# Patient Record
Sex: FEMALE | Race: WHITE | NOT HISPANIC OR LATINO | URBAN - METROPOLITAN AREA
[De-identification: names, ages, dates, MRNs, and addresses within clinical notes are randomized per-mention and may not be internally consistent; named-entity substitution may affect disease eponyms.]

---

## 2024-07-06 ENCOUNTER — HOSPITAL ENCOUNTER (EMERGENCY)
Facility: HOSPITAL | Age: 34
Discharge: HOME | End: 2024-07-06
Attending: INTERNAL MEDICINE
Payer: COMMERCIAL

## 2024-07-06 VITALS
DIASTOLIC BLOOD PRESSURE: 86 MMHG | HEIGHT: 62 IN | RESPIRATION RATE: 18 BRPM | HEART RATE: 84 BPM | SYSTOLIC BLOOD PRESSURE: 130 MMHG | TEMPERATURE: 99 F | BODY MASS INDEX: 30 KG/M2 | WEIGHT: 163 LBS | OXYGEN SATURATION: 98 %

## 2024-07-06 DIAGNOSIS — M25.522 ELBOW PAIN, LEFT: ICD-10-CM

## 2024-07-06 DIAGNOSIS — S52.125A CLOSED NONDISPLACED FRACTURE OF HEAD OF LEFT RADIUS, INITIAL ENCOUNTER: Primary | ICD-10-CM

## 2024-07-06 LAB
B-HCG UR QL: NEGATIVE
CTP QC/QA: YES

## 2024-07-06 PROCEDURE — 63600175 PHARM REV CODE 636 W HCPCS: Mod: ER | Performed by: INTERNAL MEDICINE

## 2024-07-06 PROCEDURE — 81025 URINE PREGNANCY TEST: CPT | Mod: ER | Performed by: INTERNAL MEDICINE

## 2024-07-06 PROCEDURE — 96372 THER/PROPH/DIAG INJ SC/IM: CPT | Performed by: INTERNAL MEDICINE

## 2024-07-06 PROCEDURE — 99284 EMERGENCY DEPT VISIT MOD MDM: CPT | Mod: 25,ER

## 2024-07-06 PROCEDURE — 81025 URINE PREGNANCY TEST: CPT | Mod: ER

## 2024-07-06 RX ORDER — KETOROLAC TROMETHAMINE 30 MG/ML
60 INJECTION, SOLUTION INTRAMUSCULAR; INTRAVENOUS
Status: COMPLETED | OUTPATIENT
Start: 2024-07-06 | End: 2024-07-06

## 2024-07-06 RX ORDER — IBUPROFEN 800 MG/1
800 TABLET ORAL EVERY 8 HOURS PRN
Qty: 30 TABLET | Refills: 0 | Status: SHIPPED | OUTPATIENT
Start: 2024-07-06

## 2024-07-06 RX ADMIN — KETOROLAC TROMETHAMINE 60 MG: 30 INJECTION, SOLUTION INTRAMUSCULAR at 10:07

## 2024-07-07 NOTE — ED PROVIDER NOTES
Encounter Date: 7/6/2024       History     Chief Complaint   Patient presents with    Arm Injury     Left arm injury after tripping and falling onto her elbow. Edema noted at elbow. Pain radiating down left arm.      33-year-old female presents to the emergency department complaining of left elbow pain after slip and fall prior to ER presentation.  She denies any other injuries.    The history is provided by the patient. No  was used.     Review of patient's allergies indicates:   Allergen Reactions    Penicillins Rash     No past medical history on file.  No past surgical history on file.  No family history on file.     Review of Systems   Constitutional:  Negative for chills and fever.   Cardiovascular:  Negative for chest pain.   Musculoskeletal:  Positive for arthralgias. Negative for back pain, gait problem, myalgias and neck pain.   All other systems reviewed and are negative.      Physical Exam     Initial Vitals [07/06/24 2143]   BP Pulse Resp Temp SpO2   (!) 141/100 80 18 99 °F (37.2 °C) 98 %      MAP       --         Physical Exam    Nursing note and vitals reviewed.  Constitutional: She is not diaphoretic. No distress.   Neck:   Normal range of motion.  Cardiovascular:  Normal rate and regular rhythm.           Pulmonary/Chest: Breath sounds normal. No respiratory distress.   Abdominal: Abdomen is soft. Bowel sounds are normal.   Musculoskeletal:      Cervical back: Normal range of motion.      Comments: Left elbow pain upon movement without gross deformity.  There is tenderness to palpation and bilateral upper extremities are neurovascularly intact.     Neurological: She is alert. She has normal strength.   Skin: Skin is warm and dry. Capillary refill takes less than 2 seconds.   Psychiatric: She has a normal mood and affect.         ED Course   Procedures  Labs Reviewed   POCT URINE PREGNANCY          Imaging Results              X-Ray Elbow Complete Left (Final result)  Result time  07/06/24 22:22:24      Final result by Nakul Fletcher DO (07/06/24 22:22:24)                   Impression:      Nondisplaced radial head fracture.      Electronically signed by: Nakul lFetcher  Date:    07/06/2024  Time:    22:22               Narrative:    EXAMINATION:  XR ELBOW COMPLETE 3 VIEW LEFT    CLINICAL HISTORY:  Pain in left elbow    TECHNIQUE:  AP, lateral, and oblique views of the left elbow were performed.    COMPARISON:  None    FINDINGS:  There is a subtle nondisplaced fracture of the left radial head.  There is an elbow joint effusion.  No additional fractures are seen.  Alignment is normal.                                       Medications   ketorolac injection 60 mg (60 mg Intramuscular Given 7/6/24 1627)     Medical Decision Making  33-year-old female presents to the emergency department complaining of left elbow pain after slip and fall prior to ER presentation.  She denies any other injuries.  Course of ED stay:   X-ray of left elbow reveals nondisplaced radial head fracture.  Sling was placed and patient received a referral to Orthopedic surgery Clinic for further evaluation/treatment.  Toradol was given in the ED as well as a prescription for ibuprofen.  Patient was advised to follow-up with orthopedic surgery clinic as scheduled and with her PCP within the next week for re-evaluation/return to the emergency department if condition worsens.    Amount and/or Complexity of Data Reviewed  Labs: ordered.  Radiology: ordered.                                      Clinical Impression:  Final diagnoses:  [M25.522] Elbow pain, left  [S52.125A] Closed nondisplaced fracture of head of left radius, initial encounter (Primary)          ED Disposition Condition    Discharge Stable          ED Prescriptions       Medication Sig Dispense Start Date End Date Auth. Provider    ibuprofen (ADVIL,MOTRIN) 800 MG tablet Take 1 tablet (800 mg total) by mouth every 8 (eight) hours as needed. 30 tablet 7/6/2024 --  Germain Fischer MD          Follow-up Information    None          Germain Fischer MD  07/06/24 2982

## 2024-07-15 ENCOUNTER — OFFICE VISIT (OUTPATIENT)
Dept: ORTHOPEDICS | Facility: CLINIC | Age: 34
End: 2024-07-15
Payer: COMMERCIAL

## 2024-07-15 DIAGNOSIS — S52.125A CLOSED NONDISPLACED FRACTURE OF HEAD OF LEFT RADIUS, INITIAL ENCOUNTER: ICD-10-CM

## 2024-07-15 PROCEDURE — 1159F MED LIST DOCD IN RCRD: CPT | Mod: CPTII,S$GLB,, | Performed by: PHYSICIAN ASSISTANT

## 2024-07-15 PROCEDURE — 99999 PR PBB SHADOW E&M-EST. PATIENT-LVL II: CPT | Mod: PBBFAC,,, | Performed by: PHYSICIAN ASSISTANT

## 2024-07-15 PROCEDURE — 99203 OFFICE O/P NEW LOW 30 MIN: CPT | Mod: S$GLB,,, | Performed by: PHYSICIAN ASSISTANT

## 2024-07-15 NOTE — PROGRESS NOTES
Chief Complaint & History of Present Illness:  Yoselin Dunn is a 33 y.o. year old female presenting for evaluation of left elbow injury.  She tripped and fell in her kitchen on 7/6. The pain is described as achy. It is aggravated by  supination/pronation .  She has taken occasional OTC medications for this.  Her range of motion is improving. Previous treatment includes rest, tylenol, sling. There is not a history of previous injury or surgery to the elbow.      Review of patient's allergies indicates:   Allergen Reactions    Penicillins Rash         Current Outpatient Medications   Medication Sig Dispense Refill    ibuprofen (ADVIL,MOTRIN) 800 MG tablet Take 1 tablet (800 mg total) by mouth every 8 (eight) hours as needed. 30 tablet 0     No current facility-administered medications for this visit.       No past medical history on file.    No past surgical history on file.      Review of Systems:  ROS:  Constitutional: no fever or chills  Eyes: no visual changes  ENT: no nasal congestion or sore throat  Respiratory: no cough or shortness of breath  Cardiovascular: no chest pain or palpitations  Musculoskeletal: no arthralgias or myalgias      OBJECTIVE:     PHYSICAL EXAM:   General Appearance: Well nourished, well developed, in no acute distress.  CV: 2+ UE/LE distal pulses bilaterally.  Resp:  Respirations equal and unlabored.  Neurological: Mood & affect are normal.  GI: Abdomen soft and non-tender.  left  Elbow:   Skin intact  No warmth  ROM   ltsi C5-T1  + epl, io, fds, fdp   2+ RP     RADIOGRAPHS:  X-rays of the left elbow, personally reviewed by me, demonstrate nondisplaced radial head fracture.    ASSESSMENT/PLAN:     33 year old female with left elbow radial head fracture    9 days s/p injury  - Recommend gentle ROM exercises  - No heavy lifting, pushing or pulling  - D/c sling  - follow up 3 weeks with x-rays left elbow

## 2024-07-30 DIAGNOSIS — S52.125A CLOSED NONDISPLACED FRACTURE OF HEAD OF LEFT RADIUS, INITIAL ENCOUNTER: Primary | ICD-10-CM

## 2024-08-05 ENCOUNTER — HOSPITAL ENCOUNTER (OUTPATIENT)
Dept: RADIOLOGY | Facility: HOSPITAL | Age: 34
Discharge: HOME | End: 2024-08-05
Attending: PHYSICIAN ASSISTANT
Payer: COMMERCIAL

## 2024-08-05 ENCOUNTER — OFFICE VISIT (OUTPATIENT)
Dept: ORTHOPEDICS | Facility: CLINIC | Age: 34
End: 2024-08-05
Payer: COMMERCIAL

## 2024-08-05 DIAGNOSIS — S52.125D CLOSED NONDISPLACED FRACTURE OF HEAD OF LEFT RADIUS WITH ROUTINE HEALING, SUBSEQUENT ENCOUNTER: Primary | ICD-10-CM

## 2024-08-05 DIAGNOSIS — S52.125A CLOSED NONDISPLACED FRACTURE OF HEAD OF LEFT RADIUS, INITIAL ENCOUNTER: ICD-10-CM

## 2024-08-05 PROCEDURE — 99999 PR PBB SHADOW E&M-EST. PATIENT-LVL II: CPT | Mod: PBBFAC,,, | Performed by: PHYSICIAN ASSISTANT

## 2024-08-05 PROCEDURE — 1159F MED LIST DOCD IN RCRD: CPT | Mod: CPTII,S$GLB,, | Performed by: PHYSICIAN ASSISTANT

## 2024-08-05 PROCEDURE — 73080 X-RAY EXAM OF ELBOW: CPT | Mod: 26,LT,, | Performed by: RADIOLOGY

## 2024-08-05 PROCEDURE — 73080 X-RAY EXAM OF ELBOW: CPT | Mod: TC,LT

## 2024-08-05 PROCEDURE — 99212 OFFICE O/P EST SF 10 MIN: CPT | Mod: S$GLB,,, | Performed by: PHYSICIAN ASSISTANT

## 2025-03-25 ENCOUNTER — OFFICE VISIT (OUTPATIENT)
Dept: GASTROENTEROLOGY | Facility: CLINIC | Age: 35
End: 2025-03-25
Payer: COMMERCIAL

## 2025-03-25 ENCOUNTER — LAB VISIT (OUTPATIENT)
Dept: LAB | Facility: HOSPITAL | Age: 35
End: 2025-03-25
Payer: COMMERCIAL

## 2025-03-25 ENCOUNTER — RESULTS FOLLOW-UP (OUTPATIENT)
Dept: GASTROENTEROLOGY | Facility: CLINIC | Age: 35
End: 2025-03-25

## 2025-03-25 VITALS
HEIGHT: 62 IN | DIASTOLIC BLOOD PRESSURE: 92 MMHG | SYSTOLIC BLOOD PRESSURE: 128 MMHG | WEIGHT: 137 LBS | BODY MASS INDEX: 25.21 KG/M2 | HEART RATE: 69 BPM

## 2025-03-25 DIAGNOSIS — R11.2 NAUSEA AND VOMITING, UNSPECIFIED VOMITING TYPE: ICD-10-CM

## 2025-03-25 DIAGNOSIS — K59.04 CHRONIC IDIOPATHIC CONSTIPATION: ICD-10-CM

## 2025-03-25 DIAGNOSIS — R10.13 EPIGASTRIC PAIN: Primary | ICD-10-CM

## 2025-03-25 DIAGNOSIS — R10.13 EPIGASTRIC PAIN: ICD-10-CM

## 2025-03-25 LAB
ABSOLUTE EOSINOPHIL (OHS): 0.05 K/UL
ABSOLUTE MONOCYTE (OHS): 0.32 K/UL (ref 0.3–1)
ABSOLUTE NEUTROPHIL COUNT (OHS): 4.81 K/UL (ref 1.8–7.7)
ALBUMIN SERPL BCP-MCNC: 3.9 G/DL (ref 3.5–5.2)
ALP SERPL-CCNC: 61 UNIT/L (ref 40–150)
ALT SERPL W/O P-5'-P-CCNC: 16 UNIT/L (ref 10–44)
ANION GAP (OHS): 7 MMOL/L (ref 8–16)
AST SERPL-CCNC: 16 UNIT/L (ref 11–45)
BASOPHILS # BLD AUTO: 0.05 K/UL
BASOPHILS NFR BLD AUTO: 0.7 %
BILIRUB SERPL-MCNC: 0.5 MG/DL (ref 0.1–1)
BUN SERPL-MCNC: 7 MG/DL (ref 6–20)
CALCIUM SERPL-MCNC: 8.8 MG/DL (ref 8.7–10.5)
CHLORIDE SERPL-SCNC: 105 MMOL/L (ref 95–110)
CO2 SERPL-SCNC: 26 MMOL/L (ref 23–29)
CREAT SERPL-MCNC: 0.7 MG/DL (ref 0.5–1.4)
ERYTHROCYTE [DISTWIDTH] IN BLOOD BY AUTOMATED COUNT: 13.5 % (ref 11.5–14.5)
GFR SERPLBLD CREATININE-BSD FMLA CKD-EPI: >60 ML/MIN/1.73/M2
GLUCOSE SERPL-MCNC: 81 MG/DL (ref 70–110)
HCT VFR BLD AUTO: 35.5 % (ref 37–48.5)
HGB BLD-MCNC: 11.1 GM/DL (ref 12–16)
IMM GRANULOCYTES # BLD AUTO: 0.03 K/UL (ref 0–0.04)
IMM GRANULOCYTES NFR BLD AUTO: 0.4 % (ref 0–0.5)
LIPASE SERPL-CCNC: 33 U/L (ref 4–60)
LYMPHOCYTES # BLD AUTO: 1.65 K/UL (ref 1–4.8)
MCH RBC QN AUTO: 28.2 PG (ref 27–50)
MCHC RBC AUTO-ENTMCNC: 31.3 G/DL (ref 32–36)
MCV RBC AUTO: 90 FL (ref 82–98)
NUCLEATED RBC (/100WBC) (OHS): 0 /100 WBC
PLATELET # BLD AUTO: 283 K/UL (ref 150–450)
PMV BLD AUTO: 12.4 FL (ref 9.2–12.9)
POTASSIUM SERPL-SCNC: 3.7 MMOL/L (ref 3.5–5.1)
PROT SERPL-MCNC: 7.4 GM/DL (ref 6–8.4)
RBC # BLD AUTO: 3.94 M/UL (ref 4–5.4)
RELATIVE EOSINOPHIL (OHS): 0.7 %
RELATIVE LYMPHOCYTE (OHS): 23.9 % (ref 18–48)
RELATIVE MONOCYTE (OHS): 4.6 % (ref 4–15)
RELATIVE NEUTROPHIL (OHS): 69.7 % (ref 38–73)
SODIUM SERPL-SCNC: 138 MMOL/L (ref 136–145)
WBC # BLD AUTO: 6.91 K/UL (ref 3.9–12.7)

## 2025-03-25 PROCEDURE — 83690 ASSAY OF LIPASE: CPT

## 2025-03-25 PROCEDURE — 99999 PR PBB SHADOW E&M-EST. PATIENT-LVL III: CPT | Mod: PBBFAC,,,

## 2025-03-25 PROCEDURE — 84075 ASSAY ALKALINE PHOSPHATASE: CPT

## 2025-03-25 PROCEDURE — 1159F MED LIST DOCD IN RCRD: CPT | Mod: CPTII,S$GLB,,

## 2025-03-25 PROCEDURE — 1160F RVW MEDS BY RX/DR IN RCRD: CPT | Mod: CPTII,S$GLB,,

## 2025-03-25 PROCEDURE — 85025 COMPLETE CBC W/AUTO DIFF WBC: CPT

## 2025-03-25 PROCEDURE — 99204 OFFICE O/P NEW MOD 45 MIN: CPT | Mod: S$GLB,,,

## 2025-03-25 PROCEDURE — 3008F BODY MASS INDEX DOCD: CPT | Mod: CPTII,S$GLB,,

## 2025-03-25 PROCEDURE — 3080F DIAST BP >= 90 MM HG: CPT | Mod: CPTII,S$GLB,,

## 2025-03-25 PROCEDURE — 3074F SYST BP LT 130 MM HG: CPT | Mod: CPTII,S$GLB,,

## 2025-03-25 PROCEDURE — 36415 COLL VENOUS BLD VENIPUNCTURE: CPT

## 2025-03-25 RX ORDER — LUBIPROSTONE 24 UG/1
24 CAPSULE ORAL
Qty: 30 CAPSULE | Refills: 11 | Status: SHIPPED | OUTPATIENT
Start: 2025-03-25 | End: 2026-03-25

## 2025-03-25 RX ORDER — SERTRALINE HYDROCHLORIDE 50 MG/1
50 TABLET, FILM COATED ORAL DAILY
COMMUNITY

## 2025-03-25 NOTE — PROGRESS NOTES
"GENERAL GI PATIENT INTAKE:    COVID symptoms in the last 7 days (runny nose, sore throat, congestion, cough, fever): No  PCP: Hansa Barber  If not PCP-  number given to establish [phone]: No    ALLERGIES REVIEWED:  Yes    CHIEF COMPLAINT:    Chief Complaint   Patient presents with    Initial Visit    Abdominal Pain    Constipation    Bloated       VITAL SIGNS:  BP (!) 128/92   Pulse 69   Ht 5' 2" (1.575 m)   Wt 62.1 kg (137 lb)   LMP 03/16/2025   BMI 25.06 kg/m²      Change in medical, surgical, family or social history: Yes      REVIEWED MEDICATION LIST RECONCILED INCLUDING ABOVE MEDS:  Yes     "

## 2025-03-25 NOTE — PROGRESS NOTES
Gastroenterology Clinic Consultation Note    Reason for Visit:  The primary encounter diagnosis was Epigastric pain. Diagnoses of Chronic idiopathic constipation and Nausea and vomiting, unspecified vomiting type were also pertinent to this visit.    PCP:   Hansa Barber   No address on file      Initial HPI   This is a 34 y.o. female presenting for epigastric pain, RUQ pain, nausea and vomiting   Patient in clinic with above complaints. She reports epigastric/RUQ pain of new onset for the past few months. She reports the pain is daily. The pain waxes and wanes. Reports the pain as sharp and stabbing in nature. Pain radiates around right side to her back. Pain is made worse by eating greasy or fatty foods, made better by laying in fetal position. There is no relation to BM or timing of meals. She has tried enemas, laxatives and Tylenol in the past. She does report associated constipation which she believes may make pain worse. She experiences nausea and vomiting intermittently when she is having the pain. She reports BM once per week, hard in nature. She is taking Tirzepatide injections now, she was doing Semaglutide injections before that. Constipation did worsen with injections. Denies unintentional weight loss, fever, chills, diarrhea, esophageal reflux, regurgitation, hematemesis, difficulties swallowing, changes in bowel habits, changes in stool caliber, blood in stool, and melena. Denies family history of IBD, celiac, CRC. Denies abdominal surgeries outisde of c-sections or previous scopes.       Abdominal Pain  How Long: Months    Frequency: Daily    Location: Upper abdomen    Quality: Sharp stabbing    Radiate: Right around back    Aggravated or Improved with bowel movement  /eating/ movement Worse - greasy fatty   Better - fetal positions   Not relation to food timing or BM    Medications tried:  Enema, laxative, tylenol    Nausea/Vomiting/Unexplained Weight Loss: N&V - every time with pain   "  Pyrosis/Reflux/Dysphagia: No    Bowel Movements: Once per week    Melena/Hematochezia: No     Symptoms: No    GLP-1s:  Tirzepatide semaglutide    NSAIDs:  No    Anticoagulation or Antiplatelet: No    History of H.pylori: No    Prior Colonoscopy: No    Prior Upper Endoscopy: No    Family h/o Crohn's  no   Ulcerative Colitis: no   Family h/o Celiac Sprue: no   Family h/o Colon Cancer:     no   Abdominal Surgeries: C- sections        ROS:  Review of Systems   Constitutional:  Negative for chills, fever, malaise/fatigue and weight loss.   Respiratory:  Negative for cough and shortness of breath.    Cardiovascular:  Negative for chest pain.   Gastrointestinal:  Positive for abdominal pain, constipation, nausea and vomiting. Negative for blood in stool, diarrhea, heartburn and melena.   Genitourinary:  Negative for dysuria, flank pain, frequency, hematuria and urgency.   Neurological:  Negative for dizziness and weakness.        Medical History:  has a past medical history of Pregnancy.    Surgical History:  has a past surgical history that includes Tubal ligation and  section.    Family History: family history is not on file..       Review of patient's allergies indicates:   Allergen Reactions    Penicillins Rash       Medications Ordered Prior to Encounter[1]      Objective Findings:    Vital Signs:  BP (!) 128/92   Pulse 69   Ht 5' 2" (1.575 m)   Wt 62.1 kg (137 lb)   LMP 2025   BMI 25.06 kg/m²   Body mass index is 25.06 kg/m².    Physical Exam:  Physical Exam  Vitals and nursing note reviewed.   Constitutional:       General: She is not in acute distress.     Appearance: Normal appearance. She is not ill-appearing.   HENT:      Head: Normocephalic and atraumatic.      Right Ear: External ear normal.      Left Ear: External ear normal.      Nose: Nose normal.   Eyes:      General: No scleral icterus.     Extraocular Movements: Extraocular movements intact.   Cardiovascular:      Rate and Rhythm: " "Normal rate.   Pulmonary:      Effort: Pulmonary effort is normal. No respiratory distress.   Abdominal:      General: Bowel sounds are normal. There is no distension.      Palpations: Abdomen is soft.      Tenderness: There is no guarding.   Musculoskeletal:         General: Normal range of motion.      Cervical back: Normal range of motion.   Skin:     General: Skin is warm.   Neurological:      Mental Status: She is alert and oriented to person, place, and time.   Psychiatric:         Mood and Affect: Mood normal.         Behavior: Behavior is cooperative.         Thought Content: Thought content normal.             Labs:  Lab Results   Component Value Date    WBC 0-3 12/15/2022    ALT 26 10/03/2023    AST 16 10/03/2023     10/03/2023    K 3.4 (L) 10/03/2023    CREATININE 1.02 10/03/2023    BUN 8.6 10/03/2023    CO2 28 10/03/2023       Imaging reviewed: HAZEL      Endoscopy reviewed: NA       Assessment:  1. Epigastric pain    2. Chronic idiopathic constipation    3. Nausea and vomiting, unspecified vomiting type      Orders Placed This Encounter    CT Abdomen With IV Contrast Routine Oral Contrast    CBC Auto Differential    Comprehensive Metabolic Panel    Lipase    lubiprostone (AMITIZA) 24 MCG Cap       Plan:  CT abdomen to assess ongoing epigastric/RUQ pain   2.   Start Amitiza daily - It is common for Amitiza to cause diarrhea the first 7-10 days after starting it.  Amitiza is always more effective the first 7-10 days of use, and it then "wears off" afterward, meaning that the diarrhea improves.  The goal is 1 BM every day, or every other day, without "accidents" or urgency.  It is ok if the BM are still loose or watery as long as it is not more than once per day, with accidents, or urgent   3.   Blood work as above   4.   If no improvement in abdominal pain with bowel regimen consider colonoscopy  RTC 8 weeks for symptom check in/dose adjustment      Thank you for allowing me to participate in this " patient's care.    Sincerely,     KARAN CUEVAS  Gastroenterology Department  [facility] [phone]           [1]   Current Outpatient Medications on File Prior to Visit   Medication Sig Dispense Refill    sertraline (ZOLOFT) 50 MG tablet Take 50 mg by mouth once daily.      [DISCONTINUED] ibuprofen (ADVIL,MOTRIN) 800 MG tablet Take 1 tablet (800 mg total) by mouth every 8 (eight) hours as needed. 30 tablet 0     No current facility-administered medications on file prior to visit.

## 2025-03-31 ENCOUNTER — HOSPITAL ENCOUNTER (OUTPATIENT)
Dept: RADIOLOGY | Facility: HOSPITAL | Age: 35
Discharge: HOME | End: 2025-03-31
Payer: COMMERCIAL

## 2025-03-31 DIAGNOSIS — K59.04 CHRONIC IDIOPATHIC CONSTIPATION: ICD-10-CM

## 2025-03-31 DIAGNOSIS — R10.13 EPIGASTRIC PAIN: ICD-10-CM

## 2025-03-31 DIAGNOSIS — R11.2 NAUSEA AND VOMITING, UNSPECIFIED VOMITING TYPE: ICD-10-CM

## 2025-03-31 PROCEDURE — 74160 CT ABDOMEN W/CONTRAST: CPT | Mod: 26,,, | Performed by: RADIOLOGY

## 2025-03-31 PROCEDURE — 74160 CT ABDOMEN W/CONTRAST: CPT | Mod: TC

## 2025-03-31 PROCEDURE — 25500020 PHARM REV CODE 255

## 2025-03-31 PROCEDURE — A9698 NON-RAD CONTRAST MATERIALNOC: HCPCS

## 2025-03-31 RX ADMIN — BARIUM SULFATE 450 ML: 20 SUSPENSION ORAL at 03:03

## 2025-03-31 RX ADMIN — IOHEXOL 75 ML: 350 INJECTION, SOLUTION INTRAVENOUS at 03:03

## 2025-04-22 ENCOUNTER — TELEPHONE (OUTPATIENT)
Dept: ENDOSCOPY | Facility: HOSPITAL | Age: 35
End: 2025-04-22
Payer: COMMERCIAL

## 2025-04-22 ENCOUNTER — OFFICE VISIT (OUTPATIENT)
Dept: GASTROENTEROLOGY | Facility: CLINIC | Age: 35
End: 2025-04-22
Payer: COMMERCIAL

## 2025-04-22 VITALS
WEIGHT: 142 LBS | SYSTOLIC BLOOD PRESSURE: 121 MMHG | BODY MASS INDEX: 26.81 KG/M2 | HEIGHT: 61 IN | DIASTOLIC BLOOD PRESSURE: 87 MMHG | HEART RATE: 81 BPM

## 2025-04-22 DIAGNOSIS — K59.00 CONSTIPATION, UNSPECIFIED CONSTIPATION TYPE: Primary | ICD-10-CM

## 2025-04-22 DIAGNOSIS — K59.04 CHRONIC IDIOPATHIC CONSTIPATION: ICD-10-CM

## 2025-04-22 DIAGNOSIS — Z12.11 COLON CANCER SCREENING: ICD-10-CM

## 2025-04-22 DIAGNOSIS — R10.9 ABDOMINAL PAIN, UNSPECIFIED ABDOMINAL LOCATION: ICD-10-CM

## 2025-04-22 DIAGNOSIS — D64.9 NORMOCYTIC ANEMIA: ICD-10-CM

## 2025-04-22 DIAGNOSIS — R11.2 NAUSEA AND VOMITING, UNSPECIFIED VOMITING TYPE: ICD-10-CM

## 2025-04-22 DIAGNOSIS — R10.13 EPIGASTRIC PAIN: Primary | ICD-10-CM

## 2025-04-22 PROCEDURE — 3079F DIAST BP 80-89 MM HG: CPT | Mod: CPTII,S$GLB,,

## 2025-04-22 PROCEDURE — 1160F RVW MEDS BY RX/DR IN RCRD: CPT | Mod: CPTII,S$GLB,,

## 2025-04-22 PROCEDURE — 3074F SYST BP LT 130 MM HG: CPT | Mod: CPTII,S$GLB,,

## 2025-04-22 PROCEDURE — 99214 OFFICE O/P EST MOD 30 MIN: CPT | Mod: S$GLB,,,

## 2025-04-22 PROCEDURE — 99999 PR PBB SHADOW E&M-EST. PATIENT-LVL III: CPT | Mod: PBBFAC,,,

## 2025-04-22 PROCEDURE — 3008F BODY MASS INDEX DOCD: CPT | Mod: CPTII,S$GLB,,

## 2025-04-22 PROCEDURE — 1159F MED LIST DOCD IN RCRD: CPT | Mod: CPTII,S$GLB,,

## 2025-04-22 RX ORDER — ONDANSETRON 4 MG/1
4 TABLET, FILM COATED ORAL EVERY 6 HOURS PRN
Qty: 30 TABLET | Refills: 0 | Status: SHIPPED | OUTPATIENT
Start: 2025-04-22

## 2025-04-22 RX ORDER — PANTOPRAZOLE SODIUM 40 MG/1
40 TABLET, DELAYED RELEASE ORAL DAILY
Qty: 30 TABLET | Refills: 2 | Status: SHIPPED | OUTPATIENT
Start: 2025-04-22 | End: 2025-07-21

## 2025-04-22 RX ORDER — SODIUM, POTASSIUM,MAG SULFATES 17.5-3.13G
4 SOLUTION, RECONSTITUTED, ORAL ORAL DAILY
Qty: 1 KIT | Refills: 0 | Status: SHIPPED | OUTPATIENT
Start: 2025-04-22 | End: 2025-04-24

## 2025-04-22 NOTE — H&P (VIEW-ONLY)
Gastroenterology Clinic Consultation Note    Reason for Visit:  The primary encounter diagnosis was Epigastric pain. Diagnoses of Chronic idiopathic constipation, Nausea and vomiting, unspecified vomiting type, and Normocytic anemia were also pertinent to this visit.    PCP:   Hansa Barber         Interval history  This is a 34 y.o. female presenting for f/u epigastric pain, constipation, nausea and vomiting   Patient in clinic for follow-up from appointment on March 25th for epigastric pain, constipation, nausea and vomiting.  She has since gotten lab work which was unremarkable outside of a slightly low hemoglobin level.  Reports cycles on the heavier side although since having an ablation they have gotten better.  CT abdomen was unremarkable.  She reports continued epigastric pain that is sharp and stabbing radiates around back.  She reports associated nausea and vomiting with the epigastric pain.  She is not taking any medications for the epigastric pain.  She is no longer taking her Tirzepatide injections for the past 7-8 weeks now and symptoms have not subsided.  She reports improved constipation with Amitiza now having bowel movements every other day whereas before it was once per week.    Initial HPI   This is a 34 y.o. female presenting for epigastric pain, constipation, nausea and vomiting   Patient in clinic with above complaints. She reports epigastric/RUQ pain of new onset for the past few months. She reports the pain is daily. The pain waxes and wanes. Reports the pain as sharp and stabbing in nature. Pain radiates around right side to her back. Pain is made worse by eating greasy or fatty foods, made better by laying in fetal position. There is no relation to BM or timing of meals. She has tried enemas, laxatives and Tylenol in the past. She does report associated constipation which she believes may make pain worse. She experiences nausea and vomiting intermittently when she is having the  pain. She reports BM once per week, hard in nature. She is taking Tirzepatide injections now, she was doing Semaglutide injections before that. Constipation did worsen with injections. Denies unintentional weight loss, fever, chills, diarrhea, esophageal reflux, regurgitation, hematemesis, difficulties swallowing, changes in bowel habits, changes in stool caliber, blood in stool, and melena. Denies family history of IBD, celiac, CRC. Denies abdominal surgeries outisde of c-sections or previous scopes.         Abdominal Pain  How Long: Months    Frequency: Daily    Location: Upper abdomen    Quality: Sharp stabbing    Radiate: Right around back    Aggravated or Improved with bowel movement  /eating/ movement Worse - greasy fatty   Better - fetal positions   Not relation to food timing or BM    Medications tried:  Enema, laxative, tylenol    Nausea/Vomiting/Unexplained Weight Loss: N&V - every time with pain    Pyrosis/Reflux/Dysphagia: No    Bowel Movements: Once per week    Melena/Hematochezia: No     Symptoms: No    GLP-1s:  Tirzepatide semaglutide    NSAIDs:  No    Anticoagulation or Antiplatelet: No    History of H.pylori: No    Prior Colonoscopy: No    Prior Upper Endoscopy: No    Family h/o Crohn's  Cousin    Ulcerative Colitis: no   Family h/o Celiac Sprue: no   Family h/o Colon Cancer:     no   Abdominal Surgeries: C- sections      ROS:  Review of Systems   Constitutional:  Negative for chills, fever, malaise/fatigue and weight loss.   Respiratory:  Negative for shortness of breath.    Cardiovascular:  Negative for chest pain.   Gastrointestinal:  Positive for abdominal pain, nausea and vomiting. Negative for blood in stool, constipation, diarrhea, heartburn and melena.   Genitourinary:  Negative for hematuria.   Neurological:  Negative for dizziness and weakness.        Medical History:  has a past medical history of Pregnancy.    Surgical History:  has a past surgical history that includes Tubal ligation  "and  section.    Family History: family history is not on file..       Review of patient's allergies indicates:   Allergen Reactions    Penicillins Rash       Medications Ordered Prior to Encounter[1]      Objective Findings:    Vital Signs:  /87 (BP Location: Left arm, Patient Position: Sitting)   Pulse 81   Ht 5' 1" (1.549 m)   Wt 64.4 kg (141 lb 15.6 oz)   LMP 2025   BMI 26.83 kg/m²   Body mass index is 26.83 kg/m².    Physical Exam:  Physical Exam  Vitals and nursing note reviewed.   Constitutional:       General: She is not in acute distress.     Appearance: Normal appearance. She is not ill-appearing.   HENT:      Head: Normocephalic and atraumatic.      Right Ear: External ear normal.      Left Ear: External ear normal.      Nose: Nose normal.   Eyes:      General: No scleral icterus.     Extraocular Movements: Extraocular movements intact.   Cardiovascular:      Rate and Rhythm: Normal rate.   Pulmonary:      Effort: Pulmonary effort is normal. No respiratory distress.   Abdominal:      General: There is no distension.      Palpations: Abdomen is soft.      Tenderness: There is no guarding.   Musculoskeletal:         General: Normal range of motion.      Cervical back: Normal range of motion.   Skin:     General: Skin is warm.   Neurological:      Mental Status: She is alert and oriented to person, place, and time.   Psychiatric:         Mood and Affect: Mood normal.         Behavior: Behavior is cooperative.         Thought Content: Thought content normal.             Labs:  Lab Results   Component Value Date    WBC 6.91 2025    HGB 11.1 (L) 2025    HCT 35.5 (L) 2025     2025    ALKPHOS 61 2025    LIPASE 33 2025    ALT 16 2025    AST 16 2025     2025    K 3.7 2025     2025    CREATININE 0.7 2025    BUN 7 2025    CO2 26 2025       Imaging reviewed:   CT ABDOMEN WITH IV CONTRAST   "   CLINICAL HISTORY:  Epigastric pain; Epigastric pain     TECHNIQUE:  Low dose axial images, sagittal and coronal reformations were obtained from the lung bases to the pubic symphysis following the IV administration of 75 mL of Omnipaque 350 and the oral administration of 30 mL of Omnipaque 350.     COMPARISON:  None.     FINDINGS:  Abdomen:     - Lung bases: Clear.     - Liver: Normal.     - Gallbladder and bile ducts: Unremarkable.     - Spleen: Unremarkable.     - Pancreas: Normal.     - Kidneys: No mass or hydronephrosis.     - Adrenals: Unremarkable.     - Retroperitoneum:  No significant adenopathy.     - Vascular: Unremarkable.     - Bowel/mesentery: Unremarkable.  Specifically, the appendix appears normal.     Pelvis:     No pelvic mass, adenopathy, or free fluid.     Bones:  Unremarkable.     Impression:     No acute intra-abdominal process.        Electronically signed by:Kana Hamilton Jr  Date:                                            03/31/2025      Endoscopy reviewed: NA       Assessment:  1. Epigastric pain    2. Chronic idiopathic constipation    3. Nausea and vomiting, unspecified vomiting type    4. Normocytic anemia      Orders Placed This Encounter    pantoprazole (PROTONIX) 40 MG tablet    ondansetron (ZOFRAN) 4 MG tablet     Plan:  EGD and colonoscopy to further assess epigastric pain and anemia   Continue Amitiza 24 mcg daily for constipation  Zofran as needed for nausea and vomiting  Start Protonix 40 mg daily as below  Return to clinic based on scope findings      For GERD/Reflux:  Take your PPI 30-45 minutes before your first protein containing meal (breakfast) every day. Take daily for 8 weeks. If symptoms improve ok discontinue an use PPI as needed for symptoms.   Take Pepcid 20 mg every evening before bedtime to help with nocturnal symptoms, as needed.  Remain upright for at least 3 hours after eating.   Elevate the head of the bed for nighttime.   Avoid foods that you have noticed  make your symptoms worse (possible triggers include: peppermint, alcohol, chocolate, caffeine, spicy foods, greasy/fried foods, acidic foods-citrus).   Lose weight if you are overweight -- of all of the lifestyle changes you can make, this one is the most effective.  Follow up in 8 weeks with provider to assess symptoms and ability to taper off PPI (can be a virtual visit).              Thank you for allowing me to participate in this patient's care.    Sincerely,     KATJA CUEVAS-C  Gastroenterology Department  [facility] [phone]           [1]   Current Outpatient Medications on File Prior to Visit   Medication Sig Dispense Refill    lubiprostone (AMITIZA) 24 MCG Cap Take 1 capsule (24 mcg total) by mouth daily with breakfast. 30 capsule 11    sertraline (ZOLOFT) 50 MG tablet Take 50 mg by mouth once daily.       No current facility-administered medications on file prior to visit.

## 2025-04-22 NOTE — PROGRESS NOTES
Gastroenterology Clinic Consultation Note    Reason for Visit:  The primary encounter diagnosis was Epigastric pain. Diagnoses of Chronic idiopathic constipation, Nausea and vomiting, unspecified vomiting type, and Normocytic anemia were also pertinent to this visit.    PCP:   Hansa Barber         Interval history  This is a 34 y.o. female presenting for f/u epigastric pain, constipation, nausea and vomiting   Patient in clinic for follow-up from appointment on March 25th for epigastric pain, constipation, nausea and vomiting.  She has since gotten lab work which was unremarkable outside of a slightly low hemoglobin level.  Reports cycles on the heavier side although since having an ablation they have gotten better.  CT abdomen was unremarkable.  She reports continued epigastric pain that is sharp and stabbing radiates around back.  She reports associated nausea and vomiting with the epigastric pain.  She is not taking any medications for the epigastric pain.  She is no longer taking her Tirzepatide injections for the past 7-8 weeks now and symptoms have not subsided.  She reports improved constipation with Amitiza now having bowel movements every other day whereas before it was once per week.    Initial HPI   This is a 34 y.o. female presenting for epigastric pain, constipation, nausea and vomiting   Patient in clinic with above complaints. She reports epigastric/RUQ pain of new onset for the past few months. She reports the pain is daily. The pain waxes and wanes. Reports the pain as sharp and stabbing in nature. Pain radiates around right side to her back. Pain is made worse by eating greasy or fatty foods, made better by laying in fetal position. There is no relation to BM or timing of meals. She has tried enemas, laxatives and Tylenol in the past. She does report associated constipation which she believes may make pain worse. She experiences nausea and vomiting intermittently when she is having the  pain. She reports BM once per week, hard in nature. She is taking Tirzepatide injections now, she was doing Semaglutide injections before that. Constipation did worsen with injections. Denies unintentional weight loss, fever, chills, diarrhea, esophageal reflux, regurgitation, hematemesis, difficulties swallowing, changes in bowel habits, changes in stool caliber, blood in stool, and melena. Denies family history of IBD, celiac, CRC. Denies abdominal surgeries outisde of c-sections or previous scopes.         Abdominal Pain  How Long: Months    Frequency: Daily    Location: Upper abdomen    Quality: Sharp stabbing    Radiate: Right around back    Aggravated or Improved with bowel movement  /eating/ movement Worse - greasy fatty   Better - fetal positions   Not relation to food timing or BM    Medications tried:  Enema, laxative, tylenol    Nausea/Vomiting/Unexplained Weight Loss: N&V - every time with pain    Pyrosis/Reflux/Dysphagia: No    Bowel Movements: Once per week    Melena/Hematochezia: No     Symptoms: No    GLP-1s:  Tirzepatide semaglutide    NSAIDs:  No    Anticoagulation or Antiplatelet: No    History of H.pylori: No    Prior Colonoscopy: No    Prior Upper Endoscopy: No    Family h/o Crohn's  Cousin    Ulcerative Colitis: no   Family h/o Celiac Sprue: no   Family h/o Colon Cancer:     no   Abdominal Surgeries: C- sections      ROS:  Review of Systems   Constitutional:  Negative for chills, fever, malaise/fatigue and weight loss.   Respiratory:  Negative for shortness of breath.    Cardiovascular:  Negative for chest pain.   Gastrointestinal:  Positive for abdominal pain, nausea and vomiting. Negative for blood in stool, constipation, diarrhea, heartburn and melena.   Genitourinary:  Negative for hematuria.   Neurological:  Negative for dizziness and weakness.        Medical History:  has a past medical history of Pregnancy.    Surgical History:  has a past surgical history that includes Tubal ligation  "and  section.    Family History: family history is not on file..       Review of patient's allergies indicates:   Allergen Reactions    Penicillins Rash       Medications Ordered Prior to Encounter[1]      Objective Findings:    Vital Signs:  /87 (BP Location: Left arm, Patient Position: Sitting)   Pulse 81   Ht 5' 1" (1.549 m)   Wt 64.4 kg (141 lb 15.6 oz)   LMP 2025   BMI 26.83 kg/m²   Body mass index is 26.83 kg/m².    Physical Exam:  Physical Exam  Vitals and nursing note reviewed.   Constitutional:       General: She is not in acute distress.     Appearance: Normal appearance. She is not ill-appearing.   HENT:      Head: Normocephalic and atraumatic.      Right Ear: External ear normal.      Left Ear: External ear normal.      Nose: Nose normal.   Eyes:      General: No scleral icterus.     Extraocular Movements: Extraocular movements intact.   Cardiovascular:      Rate and Rhythm: Normal rate.   Pulmonary:      Effort: Pulmonary effort is normal. No respiratory distress.   Abdominal:      General: There is no distension.      Palpations: Abdomen is soft.      Tenderness: There is no guarding.   Musculoskeletal:         General: Normal range of motion.      Cervical back: Normal range of motion.   Skin:     General: Skin is warm.   Neurological:      Mental Status: She is alert and oriented to person, place, and time.   Psychiatric:         Mood and Affect: Mood normal.         Behavior: Behavior is cooperative.         Thought Content: Thought content normal.             Labs:  Lab Results   Component Value Date    WBC 6.91 2025    HGB 11.1 (L) 2025    HCT 35.5 (L) 2025     2025    ALKPHOS 61 2025    LIPASE 33 2025    ALT 16 2025    AST 16 2025     2025    K 3.7 2025     2025    CREATININE 0.7 2025    BUN 7 2025    CO2 26 2025       Imaging reviewed:   CT ABDOMEN WITH IV CONTRAST   "   CLINICAL HISTORY:  Epigastric pain; Epigastric pain     TECHNIQUE:  Low dose axial images, sagittal and coronal reformations were obtained from the lung bases to the pubic symphysis following the IV administration of 75 mL of Omnipaque 350 and the oral administration of 30 mL of Omnipaque 350.     COMPARISON:  None.     FINDINGS:  Abdomen:     - Lung bases: Clear.     - Liver: Normal.     - Gallbladder and bile ducts: Unremarkable.     - Spleen: Unremarkable.     - Pancreas: Normal.     - Kidneys: No mass or hydronephrosis.     - Adrenals: Unremarkable.     - Retroperitoneum:  No significant adenopathy.     - Vascular: Unremarkable.     - Bowel/mesentery: Unremarkable.  Specifically, the appendix appears normal.     Pelvis:     No pelvic mass, adenopathy, or free fluid.     Bones:  Unremarkable.     Impression:     No acute intra-abdominal process.        Electronically signed by:Kana Hamilton Jr  Date:                                            03/31/2025      Endoscopy reviewed: NA       Assessment:  1. Epigastric pain    2. Chronic idiopathic constipation    3. Nausea and vomiting, unspecified vomiting type    4. Normocytic anemia      Orders Placed This Encounter    pantoprazole (PROTONIX) 40 MG tablet    ondansetron (ZOFRAN) 4 MG tablet     Plan:  EGD and colonoscopy to further assess epigastric pain and anemia   Continue Amitiza 24 mcg daily for constipation  Zofran as needed for nausea and vomiting  Start Protonix 40 mg daily as below  Return to clinic based on scope findings      For GERD/Reflux:  Take your PPI 30-45 minutes before your first protein containing meal (breakfast) every day. Take daily for 8 weeks. If symptoms improve ok discontinue an use PPI as needed for symptoms.   Take Pepcid 20 mg every evening before bedtime to help with nocturnal symptoms, as needed.  Remain upright for at least 3 hours after eating.   Elevate the head of the bed for nighttime.   Avoid foods that you have noticed  make your symptoms worse (possible triggers include: peppermint, alcohol, chocolate, caffeine, spicy foods, greasy/fried foods, acidic foods-citrus).   Lose weight if you are overweight -- of all of the lifestyle changes you can make, this one is the most effective.  Follow up in 8 weeks with provider to assess symptoms and ability to taper off PPI (can be a virtual visit).              Thank you for allowing me to participate in this patient's care.    Sincerely,     KATJA CUEVAS-C  Gastroenterology Department  [facility] [address] [facility] [phone]           [1]   Current Outpatient Medications on File Prior to Visit   Medication Sig Dispense Refill    lubiprostone (AMITIZA) 24 MCG Cap Take 1 capsule (24 mcg total) by mouth daily with breakfast. 30 capsule 11    sertraline (ZOLOFT) 50 MG tablet Take 50 mg by mouth once daily.       No current facility-administered medications on file prior to visit.

## 2025-04-22 NOTE — TELEPHONE ENCOUNTER
"Referral for procedure from [facility]      Spoke to patient to schedule procedure(s) Colonoscopy/EGD       Physician to perform procedure(s) Dr. HERMILA Palacios  Date of Procedure (s) 5/8/25  Arrival Time 10:00 AM  Time of Procedure(s) 11:00 AM   Location of Procedure(s) [place] [address]nd Floor  Type of Rx Prep sent to patient: SuprepExtended  Instructions provided to patient via Copy in hand    Patient was informed on the following information and verbalized understanding. Screening questionnaire reviewed with patient and complete. If procedure requires anesthesia, a responsible adult needs to be present to accompany the patient home, patient cannot drive after receiving anesthesia. Appointment details are tentative, especially check-in time. Patient will receive a prep-op call 7 days prior to confirm check-in time for procedure. If applicable the patient should contact their pharmacy to verify Rx for procedure prep is ready for pick-up. Patient was advised to call the scheduling department at [phone] if pharmacy states no Rx is available. Patient was advised to call the endoscopy scheduling department if any questions or concerns arise.       Endoscopy Scheduling Department           Mallory Jacobo, KARAN  P [facility] Patients  Caller: Unspecified (Today,  2:48 PM)  Procedure: EGD/Colonoscopy    Diagnosis: Constipation, Abdominal pain, and Nausea/Vomiting    Procedure Timing: Within 12 weeks    *If within 4 weeks selected, please nona as high priority*    *If greater than 12 weeks, please select "5-12 weeks" and delay sending until 3 months prior to requested date*    Location: Any Site    Additional Scheduling Information: No scheduling concerns    Prep Specifications:Extended/Constipation prep    Is the patient taking a GLP-1 Agonist:no    Have you attached a patient to this message: yes  "

## 2025-04-27 ENCOUNTER — HOSPITAL ENCOUNTER (EMERGENCY)
Facility: HOSPITAL | Age: 35
Discharge: HOME | End: 2025-04-27
Attending: EMERGENCY MEDICINE
Payer: COMMERCIAL

## 2025-04-27 ENCOUNTER — PATIENT MESSAGE (OUTPATIENT)
Dept: GASTROENTEROLOGY | Facility: CLINIC | Age: 35
End: 2025-04-27
Payer: COMMERCIAL

## 2025-04-27 VITALS
RESPIRATION RATE: 18 BRPM | HEART RATE: 97 BPM | HEIGHT: 61 IN | OXYGEN SATURATION: 100 % | DIASTOLIC BLOOD PRESSURE: 85 MMHG | BODY MASS INDEX: 26.62 KG/M2 | TEMPERATURE: 98 F | WEIGHT: 141 LBS | SYSTOLIC BLOOD PRESSURE: 121 MMHG

## 2025-04-27 DIAGNOSIS — K52.9 COLITIS: ICD-10-CM

## 2025-04-27 DIAGNOSIS — R93.5 ABNORMAL CT OF THE ABDOMEN: Primary | ICD-10-CM

## 2025-04-27 LAB
ALBUMIN SERPL-MCNC: 3.8 G/DL (ref 3.3–5.5)
ALBUMIN SERPL-MCNC: 3.9 G/DL (ref 3.3–5.5)
ALP SERPL-CCNC: 62 U/L (ref 42–141)
ALP SERPL-CCNC: 64 U/L (ref 42–141)
B-HCG UR QL: NEGATIVE
BILIRUB SERPL-MCNC: 0.7 MG/DL (ref 0.2–1.6)
BILIRUB SERPL-MCNC: 0.7 MG/DL (ref 0.2–1.6)
BILIRUBIN, POC UA: ABNORMAL
BLOOD, POC UA: ABNORMAL
BUN SERPL-MCNC: 7 MG/DL (ref 7–22)
CALCIUM SERPL-MCNC: 9.5 MG/DL (ref 8–10.3)
CHLORIDE SERPL-SCNC: 107 MMOL/L (ref 98–108)
CLARITY, UA: CLEAR
COLOR, UA: YELLOW
CREAT SERPL-MCNC: 0.9 MG/DL (ref 0.6–1.2)
CTP QC/QA: YES
GLUCOSE SERPL-MCNC: 95 MG/DL (ref 73–118)
GLUCOSE, POC UA: NEGATIVE
HCT, POC: NORMAL
HGB, POC: NORMAL (ref 14–18)
KETONES, POC UA: NEGATIVE
LEUKOCYTE EST, POC UA: NEGATIVE
MCH, POC: NORMAL
MCHC, POC: NORMAL
MCV, POC: NORMAL
MPV, POC: NORMAL
NITRITE, POC UA: NEGATIVE
PH UR STRIP: 6.5 [PH] (ref 5–8)
POC ALT (SGPT): 13 U/L (ref 10–47)
POC ALT (SGPT): 16 U/L (ref 10–47)
POC AMYLASE: 39 U/L (ref 14–97)
POC AST (SGOT): 20 U/L (ref 11–38)
POC AST (SGOT): 24 U/L (ref 11–38)
POC GGT: 10 U/L (ref 5–65)
POC PLATELET COUNT: NORMAL
POC TCO2: 29 MMOL/L (ref 18–33)
POTASSIUM BLD-SCNC: 4.4 MMOL/L (ref 3.6–5.1)
PROTEIN, POC UA: ABNORMAL
PROTEIN, POC: 7.7 G/DL (ref 6.4–8.1)
PROTEIN, POC: 7.8 G/DL (ref 6.4–8.1)
RBC, POC: NORMAL
RDW, POC: NORMAL
SODIUM BLD-SCNC: 143 MMOL/L (ref 128–145)
SPECIFIC GRAVITY, POC UA: >=1.03 (ref 1–1.03)
UROBILINOGEN, POC UA: 0.2 E.U./DL
WBC, POC: NORMAL

## 2025-04-27 PROCEDURE — 25500020 PHARM REV CODE 255: Mod: ER | Performed by: EMERGENCY MEDICINE

## 2025-04-27 PROCEDURE — 96375 TX/PRO/DX INJ NEW DRUG ADDON: CPT | Mod: ER

## 2025-04-27 PROCEDURE — 80053 COMPREHEN METABOLIC PANEL: CPT | Mod: ER

## 2025-04-27 PROCEDURE — 81025 URINE PREGNANCY TEST: CPT | Mod: ER

## 2025-04-27 PROCEDURE — 82150 ASSAY OF AMYLASE: CPT | Mod: ER

## 2025-04-27 PROCEDURE — 85025 COMPLETE CBC W/AUTO DIFF WBC: CPT | Mod: ER

## 2025-04-27 PROCEDURE — 82040 ASSAY OF SERUM ALBUMIN: CPT | Mod: 59,ER

## 2025-04-27 PROCEDURE — 96374 THER/PROPH/DIAG INJ IV PUSH: CPT | Mod: ER

## 2025-04-27 PROCEDURE — 25000003 PHARM REV CODE 250: Mod: ER

## 2025-04-27 PROCEDURE — 99285 EMERGENCY DEPT VISIT HI MDM: CPT | Mod: 25,ER

## 2025-04-27 PROCEDURE — 63600175 PHARM REV CODE 636 W HCPCS: Mod: ER

## 2025-04-27 PROCEDURE — 96361 HYDRATE IV INFUSION ADD-ON: CPT | Mod: ER

## 2025-04-27 RX ORDER — FAMOTIDINE 10 MG/ML
20 INJECTION, SOLUTION INTRAVENOUS
Status: COMPLETED | OUTPATIENT
Start: 2025-04-27 | End: 2025-04-27

## 2025-04-27 RX ORDER — CIPROFLOXACIN 500 MG/1
500 TABLET ORAL 2 TIMES DAILY
Qty: 14 TABLET | Refills: 0 | Status: SHIPPED | OUTPATIENT
Start: 2025-04-27 | End: 2025-05-04

## 2025-04-27 RX ORDER — SODIUM CHLORIDE 9 MG/ML
1000 INJECTION, SOLUTION INTRAVENOUS
Status: COMPLETED | OUTPATIENT
Start: 2025-04-27 | End: 2025-04-27

## 2025-04-27 RX ORDER — KETOROLAC TROMETHAMINE 30 MG/ML
15 INJECTION, SOLUTION INTRAMUSCULAR; INTRAVENOUS
Status: COMPLETED | OUTPATIENT
Start: 2025-04-27 | End: 2025-04-27

## 2025-04-27 RX ORDER — ONDANSETRON 4 MG/1
4 TABLET, ORALLY DISINTEGRATING ORAL EVERY 6 HOURS PRN
Qty: 14 TABLET | Refills: 0 | Status: SHIPPED | OUTPATIENT
Start: 2025-04-27

## 2025-04-27 RX ORDER — ONDANSETRON HYDROCHLORIDE 2 MG/ML
8 INJECTION, SOLUTION INTRAVENOUS
Status: COMPLETED | OUTPATIENT
Start: 2025-04-27 | End: 2025-04-27

## 2025-04-27 RX ORDER — METRONIDAZOLE 500 MG/1
500 TABLET ORAL EVERY 8 HOURS
Qty: 21 TABLET | Refills: 0 | Status: SHIPPED | OUTPATIENT
Start: 2025-04-27 | End: 2025-05-04

## 2025-04-27 RX ADMIN — SODIUM CHLORIDE 1000 ML: 9 INJECTION, SOLUTION INTRAVENOUS at 03:04

## 2025-04-27 RX ADMIN — KETOROLAC TROMETHAMINE 15 MG: 30 INJECTION, SOLUTION INTRAMUSCULAR; INTRAVENOUS at 04:04

## 2025-04-27 RX ADMIN — ONDANSETRON 8 MG: 2 INJECTION INTRAMUSCULAR; INTRAVENOUS at 04:04

## 2025-04-27 RX ADMIN — IOHEXOL 75 ML: 350 INJECTION, SOLUTION INTRAVENOUS at 04:04

## 2025-04-27 RX ADMIN — FAMOTIDINE 20 MG: 10 INJECTION, SOLUTION INTRAVENOUS at 04:04

## 2025-04-27 NOTE — DISCHARGE INSTRUCTIONS
You were seen in the emergency department today for colitis.  Follow up with your GI doctor for colonoscopy.  Please take all medications as prescribed and as we discussed.  Follow-up with specialist if instructed to do so.  It is important to remember that some problems are difficult to diagnose and may not be found during your Emergency Department visit. Be sure to follow up with your primary care doctor and review all labs/imaging/tests that were performed during this visit with them. Some labs/tests may be outside of the normal range and require non-emergent follow-up and further investigation to help diagnose/exclude/prevent complications or other medical conditions. Return to the emergency department for any new or worsening symptoms. Thank you for allowing me to care for you today, it was my pleasure. I hope you get to feeling better soon!

## 2025-04-27 NOTE — Clinical Note
"Yoselin Whiteica" Shaun was seen and treated in our emergency department on 4/27/2025.  She may return to work on 04/30/2025.       If you have any questions or concerns, please don't hesitate to call.      Al Jenkins PA-C"

## 2025-04-27 NOTE — ED PROVIDER NOTES
Encounter Date: 2025       History     Chief Complaint   Patient presents with    Fever     C/O INTERMITTENT FEVER X 3 DAYS WITH ABDOMINAL AND BACK PAIN     Patient is a 34-year-old female with no pertinent past medical history who presents to the emergency department for evaluation of worsening generalized abdominal pain x 2 days.  Reports has been having chronic abdominal pain over the last 1-2 years but worsened 2 days ago. Reports associated fever, lower back pain, n/v/d.  Denies hematemesis or hematochezia.  Denies dysuria, urinary frequency, hematuria, flank pain.  Last menstrual cycle 4/10/25, denies concerns for pregnancy.  Denies vaginal bleeding, vaginal discharge.  Denies history of abdominal surgeries.  Reports has been seeing GI, reports having normal CT scan 1 month ago and is scheduled for EGD/colonoscopy on 25.  Reports social alcohol use.  Denies drug use.  No history of pancreatitis, gallstones.  Denies cough, congestion, rhinorrhea, sore throat, ear pain.  Reports intermittent headache when she has the fever but resolves with ibuprofen or Tylenol.  No changes in vision.  No lightheadedness or dizziness.  No chest pain or shortness of breath.  No further complaints.    The history is provided by the patient.     Review of patient's allergies indicates:   Allergen Reactions    Penicillins Rash     Past Medical History:   Diagnosis Date    Pregnancy     x2     Past Surgical History:   Procedure Laterality Date     SECTION      x2    TUBAL LIGATION       Family History   Problem Relation Name Age of Onset    Colon cancer Neg Hx      Esophageal cancer Neg Hx       Social History[1]  Review of Systems   Constitutional:  Positive for fever.   HENT:  Negative for congestion, ear pain, rhinorrhea, sore throat and trouble swallowing.    Eyes:  Negative for visual disturbance.   Respiratory:  Negative for cough and shortness of breath.    Cardiovascular:  Negative for chest pain and  palpitations.   Gastrointestinal:  Positive for abdominal pain, diarrhea, nausea and vomiting. Negative for blood in stool.   Genitourinary:  Negative for dysuria, flank pain, frequency, hematuria, vaginal bleeding and vaginal discharge.   Musculoskeletal:  Positive for back pain. Negative for neck pain and neck stiffness.   Neurological:  Positive for headaches. Negative for dizziness, weakness and light-headedness.       Physical Exam     Initial Vitals [04/27/25 1504]   BP Pulse Resp Temp SpO2   123/80 105 18 98.3 °F (36.8 °C) 100 %      MAP       --         Physical Exam    Nursing note and vitals reviewed.  Constitutional: She appears well-developed and well-nourished.   HENT:   Head: Normocephalic and atraumatic.   Right Ear: External ear normal.   Left Ear: External ear normal.   Neck: Carotid bruit is not present.   Normal range of motion.  Cardiovascular:  Normal rate, regular rhythm, normal heart sounds and intact distal pulses.     Exam reveals no gallop and no friction rub.       No murmur heard.  Pulmonary/Chest: Breath sounds normal. No respiratory distress. She has no wheezes. She has no rhonchi. She has no rales.   Abdominal: Abdomen is soft. Bowel sounds are normal. She exhibits no distension. There is generalized abdominal tenderness.   No right CVA tenderness.  No left CVA tenderness. There is no rebound, no guarding, no tenderness at McBurney's point and negative Aiken's sign. negative obturator sign, negative psoas sign and negative Rovsing's sign  Musculoskeletal:         General: Normal range of motion.      Cervical back: Normal range of motion.      Comments: No tenderness to the lumbar spine or paraspinal muscles.     Neurological: She is alert and oriented to person, place, and time. GCS score is 15. GCS eye subscore is 4. GCS verbal subscore is 5. GCS motor subscore is 6.   Psychiatric: She has a normal mood and affect.         ED Course   Procedures  Labs Reviewed   POCT URINALYSIS W/O  SCOPE - Abnormal       Result Value    Glucose, UA Negative      Bilirubin, UA 1+ (*)     Ketones, UA Negative      Spec Grav UA >=1.030 (*)     Blood, UA Trace-intact (*)     PH, UA 6.5      Protein, UA 2+ (*)     Urobilinogen, UA 0.2      Nitrite, UA Negative      Leukocytes, UA Negative      Color, UA POC Yellow      Clarity, UA, POC Clear     POCT URINE PREGNANCY    POC Preg Test, Ur Negative       Acceptable Yes     POCT CBC    Hematocrit        Hemoglobin        RBC        WBC        MCV        MCH, POC        MCHC        RDW-CV        Platelet Count, POC        MPV       POCT URINALYSIS W/O SCOPE   POCT CMP   POCT LIVER PANEL   POCT CMP    Albumin, POC 3.8      Alkaline Phosphatase, POC 62      ALT (SGPT), POC 16      AST (SGOT), POC 24      POC BUN 7      Calcium, POC 9.5      POC Chloride 107      POC Creatinine 0.9      POC Glucose 95      POC Potassium 4.4      POC Sodium 143      Bilirubin, POC 0.7      POC TCO2 29      Protein, POC 7.8     POCT LIVER PANEL    Albumin, POC 3.9      Alkaline Phosphatase, POC 64      ALT (SGPT), POC 13      Amylase, POC 39      AST (SGOT), POC 20      POC GGT 10      Bilirubin, POC 0.7      Protein, POC 7.7            Imaging Results               CT Abdomen Pelvis With IV Contrast NO Oral Contrast (Final result)  Result time 04/27/25 17:20:48      Final result by Keyla Nevarez MD (04/27/25 17:20:48)                   Impression:      Probable subcentimeter right hepatic cyst.    Mild splenomegaly.    Thick-walled appearance of the ascending colon, which may be due to underdistension, early or resolved colitis, and much less likely neoplasia.    This report was flagged in Epic as abnormal.      Electronically signed by: Keyla Nevarez  Date:    04/27/2025  Time:    17:20               Narrative:    EXAMINATION:  CT OF ABDOMEN PELVIS WITH    CLINICAL HISTORY:  Intermittent fever x3 days with abdominal and back pain.    TECHNIQUE:  5 mm enhanced axial  images were obtained from the lung bases through the greater trochanters.  Seventy-five mL of Omnipaque 350 was injected.    COMPARISON:  03/31/2025    FINDINGS:  A too small to characterize low attenuation lesion is seen in the right lobe of the liver, which may represent a subcentimeter cyst (series 2 axial image 22).  There is no intrahepatic biliary ductal dilatation or definite focal mass.    Pancreas, kidneys, and adrenal glands are unremarkable. The gallbladder contains no calcified gallstones.    The spleen is mildly enlarged measuring 4.2 x 5.3 cm (series 2 axial image 21).    There is no definite evidence for abdominal adenopathy or ascites.  A tiny fat containing umbilical hernia is present.    There is mild thick-walled appearance of the ascending colon (series 601 coronal image 34).  The terminal ileum is unremarkable..  No pericolonic infiltration is detected.  There are no pelvic masses or adenopathy.  The appendix is not inflamed.    There is trace free fluid in the pelvis.    There is mild bibasilar atelectasis.                                       Medications   ketorolac injection 15 mg (15 mg Intravenous Given 4/27/25 1609)   ondansetron injection 8 mg (8 mg Intravenous Given 4/27/25 1609)   famotidine (PF) injection 20 mg (20 mg Intravenous Given 4/27/25 1609)   0.9% NaCl infusion (0 mLs Intravenous Stopped 4/27/25 1756)   iohexoL (OMNIPAQUE 350) injection 75 mL (75 mLs Intravenous Given 4/27/25 1654)     Medical Decision Making  This is an emergent evaluation of a  34-year-old female with no pertinent past medical history who presents to the emergency department for evaluation of worsening generalized abdominal pain x 2 days.  Reports has been having chronic abdominal pain over the last 1-2 years but worsened 2 days ago. Reports associated fever, lower back pain, n/v/d.    Physical exam as above.    Differential diagnosis includes but is not limited to viral gastroenteritis, acute appendicitis,  cholecystitis, GERD/gastritis, pancreatitis, diverticulitis, urinary tract infection, pregnancy, ectopic pregnancy, dehydration, electrolyte derangement, other viral syndrome.    Workup initiated with basic labs, amylase, urinalysis, UPT, CT abdomen and pelvis with IV contrast.  Ordered fluids, Zofran, Pepcid, Toradol.  Vital signs, chart, labs, and/or imaging were all reviewed.  See ED course below and interpretations above. My overall impression is colitis.  Serial abdominal exam benign. Will discharge home with ciprofloxacin, metronidazole, Zofran with GI follow-up.  She already has colonoscopy planned. Vital signs are reassuring. Patient/Caregiver is stable for discharge at this time.  Patient/Caregiver was informed of results, plan of care, and are comfortable with this.  All questions and concerns were addressed. Discussed strict return precautions with the patient/caregiver. Instructed follow up with primary care provider within 1 week.      Al Jenkins PA-C    DISCLAIMER: This note was prepared with [employer] voice recognition transcription software. Garbled syntax, mangled pronouns, and other bizarre constructions may be attributed to that software system.       Amount and/or Complexity of Data Reviewed  Labs: ordered. Decision-making details documented in ED Course.  Radiology: ordered. Decision-making details documented in ED Course.    Risk  Prescription drug management.               ED Course as of 04/27/25 1756   Sun Apr 27, 2025   1507 BP: 123/80 [TM]   1507 Temp: 98.3 °F (36.8 °C) [TM]   1507 Pulse: 105 [TM]   1507 Resp: 18 [TM]   1507 SpO2: 100 % [TM]   1524 Patient had a CT scan on 3/25/25:    EXAMINATION:  CT ABDOMEN WITH IV CONTRAST     CLINICAL HISTORY:  Epigastric pain; Epigastric pain     TECHNIQUE:  Low dose axial images, sagittal and coronal reformations were obtained from the lung bases to the pubic symphysis following the IV administration of 75 mL of Omnipaque 350 and the oral  administration of 30 mL of Omnipaque 350.     COMPARISON:  None.     FINDINGS:  Abdomen:     - Lung bases: Clear.     - Liver: Normal.     - Gallbladder and bile ducts: Unremarkable.     - Spleen: Unremarkable.     - Pancreas: Normal.     - Kidneys: No mass or hydronephrosis.     - Adrenals: Unremarkable.     - Retroperitoneum:  No significant adenopathy.     - Vascular: Unremarkable.     - Bowel/mesentery: Unremarkable.  Specifically, the appendix appears normal.     Pelvis:     No pelvic mass, adenopathy, or free fluid.     Bones:  Unremarkable.     Impression:     No acute intra-abdominal process.        Electronically signed by:Kana Hamilton Jr  Date:                                            03/31/2025  Time:                                           16:31   [TM]   1525 Patient reports has been seeing GI. She is schedule for EGD and Colonoscopy on 5/8/25. [TM]   1554 POCT URINALYSIS W/O SCOPE(!)  No signs of infectious process. [TM]   1555 POCT CBC  CBC is without leukocytosis or anemia.  Normal platelets. [TM]   1558 POCT urine pregnancy  Negative. [TM]   1603 POCT Liver Panel  Grossly unremarkable. [TM]   1603 POCT CMP  Normal CMP [TM]   1728 CT Abdomen Pelvis With IV Contrast NO Oral Contrast(!)  Impression:     Probable subcentimeter right hepatic cyst.     Mild splenomegaly.     Thick-walled appearance of the ascending colon, which may be due to underdistension, early or resolved colitis, and much less likely neoplasia.     This report was flagged in Epic as abnormal.   [TM]   1754 Informed patient of results.  She reports feeling improved.  Appears much more comfortable.  Serial abdominal exam benign.  Given CT findings, patient having diarrhea, we will go ahead and treat with Cipro and Flagyl.  She already has colonoscopy scheduled with GI.  We will have her follow-up.  Will do Zofran for nausea.  Strict return precautions provided. [TM]   1758 Patient is currently stable for discharge. I see no  indication of an emergent process beyond that addressed during our encounter but have duly counseled the patient/family regarding the need for prompt follow-up as well as the indications that should prompt immediate return to the emergency room should new or worrisome developments occur. I discussed the ED work up and diagnostic findings with the patient/family. The patient/family has been provided with verbal and printed direction regarding our final diagnosis(es) as well as instructions regarding use of OTC and/or Rx medications intended to manage the patient's aforementioned conditions. The patient/family verbalized an understanding. The patient/family is asked if there are any questions or concerns. We discuss the case, until all issues are addressed to the patient/family's satisfaction. Patient/family understands and is agreeable to the plan.    [TM]      ED Course User Index  [TM] Al Jenkins PA-C                           Clinical Impression:  Final diagnoses:  [R93.5] Abnormal CT of the abdomen (Primary)  [K52.9] Colitis          ED Disposition Condition    Discharge Stable          ED Prescriptions       Medication Sig Dispense Start Date End Date Auth. Provider    metroNIDAZOLE (FLAGYL) 500 MG tablet Take 1 tablet (500 mg total) by mouth every 8 (eight) hours. for 7 days 21 tablet 4/27/2025 5/4/2025 Al Jenkins PA-C    ciprofloxacin HCl (CIPRO) 500 MG tablet Take 1 tablet (500 mg total) by mouth 2 (two) times daily. for 7 days 14 tablet 4/27/2025 5/4/2025 Al Jenkins PA-C    ondansetron (ZOFRAN-ODT) 4 MG TbDL Take 1 tablet (4 mg total) by mouth every 6 (six) hours as needed. 14 tablet 4/27/2025 -- Al Jenkins PA-C          Follow-up Information       Follow up With Specialties Details Why Contact Info    [facility] ED Emergency Medicine Go to  As needed, If symptoms worsen, or new symptoms develop [address] [facility] [address] [phone]    Primary  care doctor  Schedule an appointment as soon as possible for a visit in 3 days                 [1]   Social History  Tobacco Use    Smoking status: Never    Smokeless tobacco: Never   Substance Use Topics    Alcohol use: Not Currently     Alcohol/week: 1.0 standard drink of alcohol     Types: 1 Cans of beer per week    Drug use: Never        Al Jenkins PA-C  04/27/25 9022

## 2025-04-28 ENCOUNTER — PATIENT MESSAGE (OUTPATIENT)
Dept: GASTROENTEROLOGY | Facility: CLINIC | Age: 35
End: 2025-04-28
Payer: COMMERCIAL

## 2025-04-30 ENCOUNTER — ANESTHESIA EVENT (OUTPATIENT)
Dept: ENDOSCOPY | Facility: HOSPITAL | Age: 35
End: 2025-04-30
Payer: COMMERCIAL

## 2025-04-30 NOTE — ANESTHESIA PREPROCEDURE EVALUATION
2025  Yoselin Dunn is a 34 y.o., female.  Past Medical History:   Diagnosis Date    Pregnancy     x2     Past Surgical History:   Procedure Laterality Date     SECTION      x2    TUBAL LIGATION           Pre-op Assessment    I have reviewed the Patient Summary Reports.     I have reviewed the Nursing Notes. I have reviewed the NPO Status.   I have reviewed the Medications.     Review of Systems  Anesthesia Hx:  No problems with previous Anesthesia             Denies Family Hx of Anesthesia complications.    Denies Personal Hx of Anesthesia complications.                    Social:  Social Alcohol Use, Non-Smoker       Hematology/Oncology:  Hematology Normal   Oncology Normal                                   EENT/Dental:  EENT/Dental Normal           Cardiovascular:  Cardiovascular Normal Exercise tolerance: good                 ECG has been reviewed.                            Pulmonary:  Pulmonary Normal                       Renal/:  Renal/ Normal                 Hepatic/GI:  Hepatic/GI Normal                    Musculoskeletal:  Musculoskeletal Normal                Neurological:  Neurology Normal                                      Endocrine:  Endocrine Normal            Dermatological:  Skin Normal    Psych:  Psychiatric Normal                       Anesthesia Plan  Type of Anesthesia, risks & benefits discussed:    Anesthesia Type: Gen Natural Airway  Intra-op Monitoring Plan: Standard ASA Monitors  Post Op Pain Control Plan: multimodal analgesia and IV/PO Opioids PRN  Induction:  IV  Informed Consent: Informed consent signed with the Patient and all parties understand the risks and agree with anesthesia plan.  All questions answered. Patient consented to blood products? No  ASA Score: 1  Day of Surgery Review of History & Physical: H&P Update referred to the  surgeon/provider.    Ready For Surgery From Anesthesia Perspective.     .

## 2025-05-08 ENCOUNTER — HOSPITAL ENCOUNTER (OUTPATIENT)
Facility: HOSPITAL | Age: 35
Discharge: HOME | End: 2025-05-08
Attending: INTERNAL MEDICINE | Admitting: INTERNAL MEDICINE
Payer: COMMERCIAL

## 2025-05-08 ENCOUNTER — ANESTHESIA (OUTPATIENT)
Dept: ENDOSCOPY | Facility: HOSPITAL | Age: 35
End: 2025-05-08
Payer: COMMERCIAL

## 2025-05-08 VITALS
RESPIRATION RATE: 19 BRPM | OXYGEN SATURATION: 100 % | BODY MASS INDEX: 25.68 KG/M2 | HEART RATE: 69 BPM | TEMPERATURE: 98 F | HEIGHT: 61 IN | SYSTOLIC BLOOD PRESSURE: 141 MMHG | WEIGHT: 136 LBS | DIASTOLIC BLOOD PRESSURE: 83 MMHG

## 2025-05-08 DIAGNOSIS — R10.13 EPIGASTRIC ABDOMINAL PAIN: Primary | ICD-10-CM

## 2025-05-08 DIAGNOSIS — R11.2 NAUSEA AND VOMITING, UNSPECIFIED VOMITING TYPE: ICD-10-CM

## 2025-05-08 DIAGNOSIS — R10.9 ABDOMINAL PAIN, UNSPECIFIED ABDOMINAL LOCATION: ICD-10-CM

## 2025-05-08 DIAGNOSIS — K59.00 CONSTIPATION, UNSPECIFIED CONSTIPATION TYPE: ICD-10-CM

## 2025-05-08 LAB
B-HCG UR QL: NEGATIVE
CTP QC/QA: YES

## 2025-05-08 PROCEDURE — 45378 DIAGNOSTIC COLONOSCOPY: CPT | Performed by: INTERNAL MEDICINE

## 2025-05-08 PROCEDURE — 25000003 PHARM REV CODE 250: Performed by: NURSE ANESTHETIST, CERTIFIED REGISTERED

## 2025-05-08 PROCEDURE — 43239 EGD BIOPSY SINGLE/MULTIPLE: CPT | Performed by: INTERNAL MEDICINE

## 2025-05-08 PROCEDURE — 43239 EGD BIOPSY SINGLE/MULTIPLE: CPT | Mod: 51,,, | Performed by: INTERNAL MEDICINE

## 2025-05-08 PROCEDURE — 37000008 HC ANESTHESIA 1ST 15 MINUTES: Performed by: INTERNAL MEDICINE

## 2025-05-08 PROCEDURE — 27201012 HC FORCEPS, HOT/COLD, DISP: Performed by: INTERNAL MEDICINE

## 2025-05-08 PROCEDURE — 45378 DIAGNOSTIC COLONOSCOPY: CPT | Mod: ,,, | Performed by: INTERNAL MEDICINE

## 2025-05-08 PROCEDURE — 99900035 HC TECH TIME PER 15 MIN (STAT)

## 2025-05-08 PROCEDURE — 63600175 PHARM REV CODE 636 W HCPCS: Performed by: NURSE ANESTHETIST, CERTIFIED REGISTERED

## 2025-05-08 PROCEDURE — 88305 TISSUE EXAM BY PATHOLOGIST: CPT | Mod: TC | Performed by: INTERNAL MEDICINE

## 2025-05-08 PROCEDURE — 94761 N-INVAS EAR/PLS OXIMETRY MLT: CPT

## 2025-05-08 PROCEDURE — 81025 URINE PREGNANCY TEST: CPT | Performed by: INTERNAL MEDICINE

## 2025-05-08 PROCEDURE — 37000009 HC ANESTHESIA EA ADD 15 MINS: Performed by: INTERNAL MEDICINE

## 2025-05-08 RX ORDER — PROPOFOL 10 MG/ML
VIAL (ML) INTRAVENOUS CONTINUOUS PRN
Status: DISCONTINUED | OUTPATIENT
Start: 2025-05-08 | End: 2025-05-08

## 2025-05-08 RX ORDER — FENTANYL CITRATE 50 UG/ML
INJECTION, SOLUTION INTRAMUSCULAR; INTRAVENOUS
Status: DISCONTINUED | OUTPATIENT
Start: 2025-05-08 | End: 2025-05-08

## 2025-05-08 RX ORDER — LIDOCAINE HYDROCHLORIDE 20 MG/ML
INJECTION INTRAVENOUS
Status: DISCONTINUED | OUTPATIENT
Start: 2025-05-08 | End: 2025-05-08

## 2025-05-08 RX ORDER — PROPOFOL 10 MG/ML
VIAL (ML) INTRAVENOUS
Status: DISCONTINUED | OUTPATIENT
Start: 2025-05-08 | End: 2025-05-08

## 2025-05-08 RX ORDER — SODIUM CHLORIDE 9 MG/ML
INJECTION, SOLUTION INTRAVENOUS CONTINUOUS
Status: DISCONTINUED | OUTPATIENT
Start: 2025-05-08 | End: 2025-05-08 | Stop reason: HOSPADM

## 2025-05-08 RX ADMIN — FENTANYL CITRATE 50 MCG: 50 INJECTION, SOLUTION INTRAMUSCULAR; INTRAVENOUS at 11:05

## 2025-05-08 RX ADMIN — GLYCOPYRROLATE 0.1 MG: 0.2 INJECTION, SOLUTION INTRAMUSCULAR; INTRAVENOUS at 11:05

## 2025-05-08 RX ADMIN — PROPOFOL 200 MCG/KG/MIN: 10 INJECTION, EMULSION INTRAVENOUS at 11:05

## 2025-05-08 RX ADMIN — LIDOCAINE HYDROCHLORIDE 100 MG: 20 INJECTION INTRAVENOUS at 11:05

## 2025-05-08 RX ADMIN — PROPOFOL 100 MG: 10 INJECTION, EMULSION INTRAVENOUS at 11:05

## 2025-05-08 RX ADMIN — SODIUM CHLORIDE: 0.9 INJECTION, SOLUTION INTRAVENOUS at 10:05

## 2025-05-08 NOTE — INTERVAL H&P NOTE
The patient has been examined and the H&P has been reviewed:    I concur with the findings and no changes have occurred since H&P was written.    Procedure risks, benefits and alternative options discussed and understood by patient/family.    EGD/Colonoscopy: Epigastric abdominal pain and anemia  Sedation: GA  ASA: Per anesthesia  Mallampati: Per anesthesia      There are no hospital problems to display for this patient.

## 2025-05-08 NOTE — TRANSFER OF CARE
"Anesthesia Transfer of Care Note    Patient: Yoselin Dunn    Procedure(s) Performed: Procedure(s) (LRB):  EGD (ESOPHAGOGASTRODUODENOSCOPY) (N/A)  COLONOSCOPY (N/A)    Patient location: PACU    Anesthesia Type: general    Transport from OR: Transported from OR on room air with adequate spontaneous ventilation    Post pain: adequate analgesia    Post assessment: no apparent anesthetic complications and tolerated procedure well    Post vital signs: stable    Level of consciousness: responds to stimulation and sedated    Nausea/Vomiting: no nausea/vomiting    Complications: none    Transfer of care protocol was followed    Last vitals: Visit Vitals  /86 (BP Location: Left arm, Patient Position: Lying)   Pulse 86   Resp 18   Ht 5' 1" (1.549 m)   Wt 61.7 kg (136 lb)   LMP 04/10/2025 (Approximate)   SpO2 99%   Breastfeeding No   BMI 25.70 kg/m²     "

## 2025-05-08 NOTE — [employer] PATIENT INSTRUCTIONS
Discharge Summary/Instructions after an Endoscopic Procedure  Patient Name: Yoselin Dunn  Patient MRN: [id number]  Patient YOB: 1990  Thursday, May 8, 2025  Rudy Palacios MD  Dear patient,  As a result of recent federal legislation (The Federal Cures Act), you may   receive lab or pathology results from your procedure in your [facility]   account before your physician is able to contact you. Your physician or   their representative will relay the results to you with their   recommendations at their soonest availability.  Thank you,  RESTRICTIONS:  During your procedure today, you received medications for sedation.  These   medications may affect your judgment, balance and coordination.  Therefore,   for 24 hours, you have the following restrictions:   - DO NOT drive a car, operate machinery, make legal/financial decisions,   sign important papers or drink alcohol.    ACTIVITY:  Today: no heavy lifting, straining or running due to procedural   sedation/anesthesia.  The following day: return to full activity including work.  DIET:  Eat and drink normally unless instructed otherwise.     TREATMENT FOR COMMON SIDE EFFECTS:  - Mild abdominal pain, nausea, belching, bloating or excessive gas:  rest,   eat lightly and use a heating pad.  - Sore Throat: treat with throat lozenges and/or gargle with warm salt   water.  - Because air was used during the procedure, expelling large amounts of air   from your rectum or belching is normal.  - If a bowel prep was taken, you may not have a bowel movement for 1-3 days.    This is normal.  SYMPTOMS TO WATCH FOR AND REPORT TO YOUR PHYSICIAN:  1. Abdominal pain or bloating, other than gas cramps.  2. Chest pain.  3. Back pain.  4. Signs of infection such as: chills or fever occurring within 24 hours   after the procedure.  5. Rectal bleeding, which would show as bright red, maroon, or black stools.   (A tablespoon of blood from the rectum is not serious, especially if    hemorrhoids are present.)  6. Vomiting.  7. Weakness or dizziness.  GO DIRECTLY TO THE NEAREST EMERGENCY ROOM IF YOU HAVE ANY OF THE FOLLOWING:      Difficulty breathing              Chills and/or fever over 101 F   Persistent vomiting and/or vomiting blood   Severe abdominal pain   Severe chest pain   Black, tarry stools   Bleeding- more than one tablespoon   Any other symptom or condition that you feel may need urgent attention  Your doctor recommends these additional instructions:  If any biopsies were taken, your doctors clinic will contact you in 1 to 2   weeks with any results.  - Patient has a contact number available for emergencies.  The signs and   symptoms of potential delayed complications were discussed with the   patient.  Return to normal activities tomorrow.  Written discharge   instructions were provided to the patient.   - Discharge patient to home.   - Resume previous diet.   - Continue present medications.   - Repeat colonoscopy in 10 years for screening purposes.   For questions, problems or results please call your physician - Rudy Palacios MD at Work:  [phone].  [facility], EMERGENCY ROOM PHONE NUMBER: [phone]  IF A COMPLICATION OR EMERGENCY SITUATION ARISES AND YOU ARE UNABLE TO REACH   YOUR PHYSICIAN - GO DIRECTLY TO THE EMERGENCY ROOM.  Rudy Palacios MD  5/8/2025 11:56:04 AM  This report has been verified and signed electronically.  Dear patient,  As a result of recent federal legislation (The Federal Cures Act), you may   receive lab or pathology results from your procedure in your [facility]   account before your physician is able to contact you. Your physician or   their representative will relay the results to you with their   recommendations at their soonest availability.  Thank you,  [employer]

## 2025-05-08 NOTE — [employer] PATIENT INSTRUCTIONS
Discharge Summary/Instructions after an Endoscopic Procedure  Patient Name: Yoselin Dunn  Patient MRN: [id number]  Patient YOB: 1990  Thursday, May 8, 2025  Rudy Palacios MD  Dear patient,  As a result of recent federal legislation (The Federal Cures Act), you may   receive lab or pathology results from your procedure in your [facility]   account before your physician is able to contact you. Your physician or   their representative will relay the results to you with their   recommendations at their soonest availability.  Thank you,  RESTRICTIONS:  During your procedure today, you received medications for sedation.  These   medications may affect your judgment, balance and coordination.  Therefore,   for 24 hours, you have the following restrictions:   - DO NOT drive a car, operate machinery, make legal/financial decisions,   sign important papers or drink alcohol.    ACTIVITY:  Today: no heavy lifting, straining or running due to procedural   sedation/anesthesia.  The following day: return to full activity including work.  DIET:  Eat and drink normally unless instructed otherwise.     TREATMENT FOR COMMON SIDE EFFECTS:  - Mild abdominal pain, nausea, belching, bloating or excessive gas:  rest,   eat lightly and use a heating pad.  - Sore Throat: treat with throat lozenges and/or gargle with warm salt   water.  - Because air was used during the procedure, expelling large amounts of air   from your rectum or belching is normal.  - If a bowel prep was taken, you may not have a bowel movement for 1-3 days.    This is normal.  SYMPTOMS TO WATCH FOR AND REPORT TO YOUR PHYSICIAN:  1. Abdominal pain or bloating, other than gas cramps.  2. Chest pain.  3. Back pain.  4. Signs of infection such as: chills or fever occurring within 24 hours   after the procedure.  5. Rectal bleeding, which would show as bright red, maroon, or black stools.   (A tablespoon of blood from the rectum is not serious, especially if    hemorrhoids are present.)  6. Vomiting.  7. Weakness or dizziness.  GO DIRECTLY TO THE NEAREST EMERGENCY ROOM IF YOU HAVE ANY OF THE FOLLOWING:      Difficulty breathing              Chills and/or fever over 101 F   Persistent vomiting and/or vomiting blood   Severe abdominal pain   Severe chest pain   Black, tarry stools   Bleeding- more than one tablespoon   Any other symptom or condition that you feel may need urgent attention  Your doctor recommends these additional instructions:  If any biopsies were taken, your doctors clinic will contact you in 1 to 2   weeks with any results.  - Patient has a contact number available for emergencies.  The signs and   symptoms of potential delayed complications were discussed with the   patient.  Return to normal activities tomorrow.  Written discharge   instructions were provided to the patient.   - Discharge patient to home.   - Resume previous diet.   - Continue present medications.   - Await pathology results.   For questions, problems or results please call your physician - Rudy Palacios MD at Work:  [phone].  [facility] [place], EMERGENCY ROOM PHONE NUMBER: [phone]  IF A COMPLICATION OR EMERGENCY SITUATION ARISES AND YOU ARE UNABLE TO REACH   YOUR PHYSICIAN - GO DIRECTLY TO THE EMERGENCY ROOM.  Rudy Palacios MD  5/8/2025 11:42:38 AM  This report has been verified and signed electronically.  Dear patient,  As a result of recent federal legislation (The Federal Cures Act), you may   receive lab or pathology results from your procedure in your [facility]   account before your physician is able to contact you. Your physician or   their representative will relay the results to you with their   recommendations at their soonest availability.  Thank you,  [employer]ATION

## 2025-05-09 ENCOUNTER — RESULTS FOLLOW-UP (OUTPATIENT)
Dept: GASTROENTEROLOGY | Facility: CLINIC | Age: 35
End: 2025-05-09

## 2025-05-09 ENCOUNTER — TELEPHONE (OUTPATIENT)
Dept: GASTROENTEROLOGY | Facility: CLINIC | Age: 35
End: 2025-05-09
Payer: COMMERCIAL

## 2025-05-09 LAB
ESTROGEN SERPL-MCNC: NORMAL PG/ML
INSULIN SERPL-ACNC: NORMAL U[IU]/ML
LAB AP CLINICAL INFORMATION: NORMAL
LAB AP GROSS DESCRIPTION: NORMAL
LAB AP PERFORMING LOCATION(S): NORMAL
LAB AP REPORT FOOTNOTES: NORMAL

## 2025-05-09 NOTE — TELEPHONE ENCOUNTER
----- Message from Rudy Palacios MD sent at 5/9/2025  3:51 PM CDT -----  Nothing concerning on biopsies. Follow up with .  ----- Message -----  From: Lab, Background User  Sent: 5/9/2025  10:27 AM CDT  To: Rudy Palacios MD

## 2025-05-26 ENCOUNTER — HOSPITAL ENCOUNTER (EMERGENCY)
Facility: HOSPITAL | Age: 35
Discharge: HOME | End: 2025-05-26
Attending: EMERGENCY MEDICINE
Payer: COMMERCIAL

## 2025-05-26 VITALS
HEIGHT: 61 IN | WEIGHT: 140 LBS | SYSTOLIC BLOOD PRESSURE: 122 MMHG | DIASTOLIC BLOOD PRESSURE: 86 MMHG | BODY MASS INDEX: 26.43 KG/M2 | OXYGEN SATURATION: 98 % | RESPIRATION RATE: 18 BRPM | HEART RATE: 88 BPM | TEMPERATURE: 99 F

## 2025-05-26 DIAGNOSIS — T14.90XA TRAUMA: ICD-10-CM

## 2025-05-26 DIAGNOSIS — S92.355A CLOSED NONDISPLACED FRACTURE OF FIFTH METATARSAL BONE OF LEFT FOOT, INITIAL ENCOUNTER: Primary | ICD-10-CM

## 2025-05-26 LAB
B-HCG UR QL: NEGATIVE
CTP QC/QA: YES

## 2025-05-26 PROCEDURE — 29515 APPLICATION SHORT LEG SPLINT: CPT | Mod: LT,ER

## 2025-05-26 PROCEDURE — 81025 URINE PREGNANCY TEST: CPT | Mod: ER | Performed by: EMERGENCY MEDICINE

## 2025-05-26 PROCEDURE — 99284 EMERGENCY DEPT VISIT MOD MDM: CPT | Mod: 25,ER

## 2025-05-26 RX ORDER — IBUPROFEN 600 MG/1
600 TABLET, FILM COATED ORAL EVERY 6 HOURS PRN
Qty: 20 TABLET | Refills: 0 | Status: SHIPPED | OUTPATIENT
Start: 2025-05-26

## 2025-05-26 RX ORDER — ACETAMINOPHEN 500 MG
1000 TABLET ORAL EVERY 6 HOURS PRN
Qty: 28 TABLET | Refills: 0 | Status: SHIPPED | OUTPATIENT
Start: 2025-05-26

## 2025-05-27 ENCOUNTER — TELEPHONE (OUTPATIENT)
Dept: PODIATRY | Facility: CLINIC | Age: 35
End: 2025-05-27
Payer: COMMERCIAL

## 2025-05-27 NOTE — TELEPHONE ENCOUNTER
Left message for patient to give callback.    ----- Message from Sylvester Faustin sent at 5/26/2025  4:30 PM CDT -----    ----- Message -----  From: Ken Ambrocio DPM  Sent: 5/26/2025   2:27 PM CDT  To: Lolly TORRES. Staff    Please schedule for this week - double book if needed.  Thank you.

## 2025-05-28 ENCOUNTER — TELEPHONE (OUTPATIENT)
Dept: PODIATRY | Facility: CLINIC | Age: 35
End: 2025-05-28

## 2025-05-28 ENCOUNTER — OFFICE VISIT (OUTPATIENT)
Dept: PODIATRY | Facility: CLINIC | Age: 35
End: 2025-05-28
Payer: COMMERCIAL

## 2025-05-28 ENCOUNTER — PATIENT MESSAGE (OUTPATIENT)
Dept: PODIATRY | Facility: CLINIC | Age: 35
End: 2025-05-28

## 2025-05-28 VITALS
BODY MASS INDEX: 26.43 KG/M2 | HEIGHT: 61 IN | DIASTOLIC BLOOD PRESSURE: 96 MMHG | HEART RATE: 101 BPM | SYSTOLIC BLOOD PRESSURE: 139 MMHG | WEIGHT: 140 LBS

## 2025-05-28 DIAGNOSIS — S92.355A CLOSED NONDISPLACED FRACTURE OF FIFTH METATARSAL BONE OF LEFT FOOT, INITIAL ENCOUNTER: ICD-10-CM

## 2025-05-28 DIAGNOSIS — M79.672 FOOT PAIN, LEFT: Primary | ICD-10-CM

## 2025-05-28 PROCEDURE — 99999 PR PBB SHADOW E&M-EST. PATIENT-LVL III: CPT | Mod: PBBFAC,,, | Performed by: PODIATRIST

## 2025-05-28 RX ORDER — LIDOCAINE AND PRILOCAINE 25; 25 MG/G; MG/G
CREAM TOPICAL
Qty: 30 G | Refills: 3 | Status: SHIPPED | OUTPATIENT
Start: 2025-05-28

## 2025-05-28 NOTE — TELEPHONE ENCOUNTER
Staff informed patient she will need to get cleared for Sx by PCP and will be scheduled to sign consent when cleared.    Patient verbalized understanding.    ----- Message from Omar sent at 5/28/2025  3:03 PM CDT -----  Type: Patient CallWho Called: Patient Does the patient know what this is regarding? Pt is requesting a call back from missed call from Yoselin. Please advise Does the patient rather a call back or a response via [facility]? [facility] Call Back Number: [phone] Additional Information:

## 2025-05-28 NOTE — PROGRESS NOTES
Subjective:      Patient ID: Yoselin Dunn is a 34 y.o. female.    Chief Complaint: Foot Injury    Sharp deep pain swelling and bruising left foot.  Rapid onset 3 days ago with a fall.  Aggravated with pressure motion.  Prior medical management at [facility]gent Care yielded x-rays show enough spiral fracture of the distal 1/3 of the 5th metatarsal left foot with minimal dorsal medial displacement of the distal fragment visible best in the oblique view (independent read).  Patient is treated with a posterior splint compression nonweightbearing with crutches.  Denies repeat trauma and surgery both feet.    Review of Systems   Constitutional: Negative for chills, diaphoresis, fever, malaise/fatigue and night sweats.   Cardiovascular:  Negative for claudication, cyanosis, leg swelling and syncope.   Skin:  Negative for color change, dry skin, nail changes, rash, suspicious lesions and unusual hair distribution.   Musculoskeletal:  Positive for joint pain and joint swelling. Negative for falls, muscle cramps, muscle weakness and stiffness.   Gastrointestinal:  Negative for constipation, diarrhea, nausea and vomiting.   Neurological:  Negative for brief paralysis, disturbances in coordination, focal weakness, numbness, paresthesias, sensory change and tremors.           Objective:      Physical Exam  Constitutional:       General: She is not in acute distress.     Appearance: She is well-developed. She is not diaphoretic.   Cardiovascular:      Pulses:           Popliteal pulses are 2+ on the right side and 2+ on the left side.        Dorsalis pedis pulses are 2+ on the right side and 2+ on the left side.        Posterior tibial pulses are 2+ on the right side and 2+ on the left side.      Comments: Capillary refill 3 seconds all toes/distal feet, all toes/both feet warm to touch.      Negative lymphadenopathy bilateral popliteal fossa and tarsal tunnel.      Negavie lower extremity edema bilateral.    Musculoskeletal:       Right ankle: No swelling, deformity, ecchymosis or lacerations. Normal range of motion. Normal pulse.      Right Achilles Tendon: Normal. No defects. Mei's test negative.      Comments: Pain to palpation left foot with mild ecchymosis left forefoot and tenderness to range motion 5th MTPJ without gross deformity or functional loss.   Lymphadenopathy:      Lower Body: No right inguinal adenopathy. No left inguinal adenopathy.      Comments: Negative lymphadenopathy bilateral popliteal fossa and tarsal tunnel.    Negative lymphangitic streaking bilateral feet/ankles/legs.   Skin:     General: Skin is warm and dry.      Capillary Refill: Capillary refill takes 2 to 3 seconds.      Coloration: Skin is not pale.      Findings: No abrasion, bruising, burn, ecchymosis, erythema, laceration, lesion or rash.      Nails: There is no clubbing.      Comments: Bruising/ecchymosis left distal lateral forefoot.    No open skin drainage pus tracking fluctuance malodor signs of infection     Neurological:      Mental Status: She is alert and oriented to person, place, and time.      Sensory: No sensory deficit.      Motor: No tremor, atrophy or abnormal muscle tone.      Gait: Gait normal.      Deep Tendon Reflexes:      Reflex Scores:       Patellar reflexes are 2+ on the right side and 2+ on the left side.       Achilles reflexes are 2+ on the right side and 2+ on the left side.     Comments: Negative tinel sign to percussion sural, superficial peroneal, deep peroneal, saphenous, and posterior tibial nerves right and left ankles and feet.    Negative allodynia both feet   Psychiatric:         Behavior: Behavior is cooperative.               Assessment:       Encounter Diagnoses   Name Primary?    Closed nondisplaced fracture of fifth metatarsal bone of left foot, initial encounter     Foot pain, left Yes         Plan:       Yoselin was seen today for foot injury.    Diagnoses and all orders for this visit:    Foot pain,  left  -     X-Ray Foot Complete Left; Future  -     AIR CAST WALKER BOOT FOR HOME USE    Closed nondisplaced fracture of fifth metatarsal bone of left foot, initial encounter  -     Ambulatory referral/consult to Podiatry  -     X-Ray Foot Complete Left; Future  -     AIR CAST WALKER BOOT FOR HOME USE    Other orders  -     LIDOcaine-prilocaine (EMLA) cream; Apply topically as needed.      I counseled the patient on her conditions, their implications and medical management.    Topical EMLA cream once nightly for pain relief to facilitate sleep.    Patient was prevent 2 options:     Conservative-pros no surgery or inherent risks to surgery-Cons potential for displacement of the fragment elevating causing potential pain/callus from increased pressure on the other metatarsals that are not broken.    Surgical-pros anatomic reduction-cons increased surgical risk for infection scar fibrosis.      Patient says she will think about it.  In the meantime, dispense fracture boot left.  Nonweightbearing 2 weeks then heel touch weight-bearing 2 weeks.      Follow here 4-5 weeks for re-evaluation and x-rays.  Continue crutches for facilitation nonweightbearing left, stability and fall prevention.    Follow up in about 5 weeks (around 7/2/2025).

## 2025-07-02 ENCOUNTER — OFFICE VISIT (OUTPATIENT)
Dept: PODIATRY | Facility: CLINIC | Age: 35
End: 2025-07-02
Payer: COMMERCIAL

## 2025-07-02 ENCOUNTER — APPOINTMENT (OUTPATIENT)
Dept: RADIOLOGY | Facility: OTHER | Age: 35
End: 2025-07-02
Attending: PODIATRIST
Payer: COMMERCIAL

## 2025-07-02 VITALS
HEART RATE: 76 BPM | SYSTOLIC BLOOD PRESSURE: 141 MMHG | DIASTOLIC BLOOD PRESSURE: 80 MMHG | HEIGHT: 61 IN | BODY MASS INDEX: 26.43 KG/M2 | WEIGHT: 140 LBS

## 2025-07-02 DIAGNOSIS — S92.355A CLOSED NONDISPLACED FRACTURE OF FIFTH METATARSAL BONE OF LEFT FOOT, INITIAL ENCOUNTER: ICD-10-CM

## 2025-07-02 DIAGNOSIS — M79.672 FOOT PAIN, LEFT: ICD-10-CM

## 2025-07-02 DIAGNOSIS — S92.355A CLOSED NONDISPLACED FRACTURE OF FIFTH METATARSAL BONE OF LEFT FOOT, INITIAL ENCOUNTER: Primary | ICD-10-CM

## 2025-07-02 PROCEDURE — 99024 POSTOP FOLLOW-UP VISIT: CPT | Mod: S$GLB,,, | Performed by: PODIATRIST

## 2025-07-02 PROCEDURE — 3079F DIAST BP 80-89 MM HG: CPT | Mod: CPTII,S$GLB,, | Performed by: PODIATRIST

## 2025-07-02 PROCEDURE — 73630 X-RAY EXAM OF FOOT: CPT | Mod: 26,LT,, | Performed by: RADIOLOGY

## 2025-07-02 PROCEDURE — 3077F SYST BP >= 140 MM HG: CPT | Mod: CPTII,S$GLB,, | Performed by: PODIATRIST

## 2025-07-02 PROCEDURE — 73630 X-RAY EXAM OF FOOT: CPT | Mod: TC,PN,LT

## 2025-07-02 PROCEDURE — 1159F MED LIST DOCD IN RCRD: CPT | Mod: CPTII,S$GLB,, | Performed by: PODIATRIST

## 2025-07-02 PROCEDURE — 99999 PR PBB SHADOW E&M-EST. PATIENT-LVL III: CPT | Mod: PBBFAC,,, | Performed by: PODIATRIST

## 2025-07-02 NOTE — PROGRESS NOTES
Subjective:      Patient ID: Yoselin Dunn is a 34 y.o. female.    Chief Complaint: Follow-up (Foot pain)    Four weeks status post spiral fracture distal metadiaphyseal 5th metatarsal left foot.  2 weeks fracture boot ambulation followed by return to shoes over the past 2 weeks as relieve symptoms entirely.  Patient has at full function without significant discomfort.  X-rays today show healing fracture with osseous union distal 5th metatarsal left in good position    Review of Systems   Constitutional: Negative for chills, diaphoresis, fever, malaise/fatigue and night sweats.   Cardiovascular:  Negative for claudication, cyanosis, leg swelling and syncope.   Skin:  Negative for color change, dry skin, nail changes, rash, suspicious lesions and unusual hair distribution.   Musculoskeletal:  Negative for falls, joint pain, joint swelling, muscle cramps, muscle weakness and stiffness.   Gastrointestinal:  Negative for constipation, diarrhea, nausea and vomiting.   Neurological:  Negative for brief paralysis, disturbances in coordination, focal weakness, numbness, paresthesias, sensory change and tremors.           Objective:      Physical Exam  Constitutional:       General: She is not in acute distress.     Appearance: She is well-developed. She is not diaphoretic.   Cardiovascular:      Pulses:           Popliteal pulses are 2+ on the right side and 2+ on the left side.        Dorsalis pedis pulses are 2+ on the right side and 2+ on the left side.        Posterior tibial pulses are 2+ on the right side and 2+ on the left side.      Comments: Capillary refill 3 seconds all toes/distal feet, all toes/both feet warm to touch.      Negative lymphadenopathy bilateral popliteal fossa and tarsal tunnel.      Negavie lower extremity edema bilateral.    Musculoskeletal:      Right ankle: No swelling, deformity, ecchymosis or lacerations. Normal range of motion. Normal pulse.      Right Achilles Tendon: Normal. No  defects. Mei's test negative.      Comments: Normal angle, base, station of gait. All ten toes without clubbing, cyanosis, or signs of ischemia.  No pain to palpation bilateral lower extremities.  Range of motion, stability, muscle strength, and muscle tone normal bilateral feet and legs.     Left foot is without deformity loss of function or signs of acute trauma   Lymphadenopathy:      Lower Body: No right inguinal adenopathy. No left inguinal adenopathy.      Comments: Negative lymphadenopathy bilateral popliteal fossa and tarsal tunnel.    Negative lymphangitic streaking bilateral feet/ankles/legs.   Skin:     General: Skin is warm and dry.      Capillary Refill: Capillary refill takes 2 to 3 seconds.      Coloration: Skin is not pale.      Findings: No abrasion, bruising, burn, ecchymosis, erythema, laceration, lesion or rash.      Nails: There is no clubbing.      Comments:      Neurological:      Mental Status: She is alert and oriented to person, place, and time.      Sensory: No sensory deficit.      Motor: No tremor, atrophy or abnormal muscle tone.      Gait: Gait normal.      Deep Tendon Reflexes:      Reflex Scores:       Patellar reflexes are 2+ on the right side and 2+ on the left side.       Achilles reflexes are 2+ on the right side and 2+ on the left side.     Comments: Negative allodynia both feet   Psychiatric:         Behavior: Behavior is cooperative.           Assessment:       Encounter Diagnosis   Name Primary?    Closed nondisplaced fracture of fifth metatarsal bone of left foot, initial encounter Yes         Plan:       Yoselin was seen today for follow-up.    Diagnoses and all orders for this visit:    Closed nondisplaced fracture of fifth metatarsal bone of left foot, initial encounter      I counseled the patient on her conditions, their implications and medical management.        Ambulation to tolerance and shoes of choice without restriction.          No follow-ups on  file.